# Patient Record
Sex: MALE | Race: WHITE | NOT HISPANIC OR LATINO | Employment: FULL TIME | URBAN - METROPOLITAN AREA
[De-identification: names, ages, dates, MRNs, and addresses within clinical notes are randomized per-mention and may not be internally consistent; named-entity substitution may affect disease eponyms.]

---

## 2019-12-30 ENCOUNTER — EVALUATION (OUTPATIENT)
Dept: PHYSICAL THERAPY | Facility: CLINIC | Age: 54
End: 2019-12-30
Payer: COMMERCIAL

## 2019-12-30 DIAGNOSIS — M77.11 RIGHT LATERAL EPICONDYLITIS: Primary | ICD-10-CM

## 2019-12-30 PROCEDURE — 97162 PT EVAL MOD COMPLEX 30 MIN: CPT | Performed by: PHYSICAL THERAPIST

## 2019-12-30 NOTE — PROGRESS NOTES
PT Evaluation     Today's date: 2019  Patient name: Hosea Luz  : 1965  MRN: [id number]  Referring provider: En Kay PA-C  Dx:   Encounter Diagnosis     ICD-10-CM    1  Right lateral epicondylitis M77 11                   Assessment  Assessment details: Hosea Luz is a 48 y o  male who presents to outpatient PT with a  Right lateral epicondylitis  (primary encounter diagnosis)  No further referral appears necessary at this time based upon examination results  Pt presents with decreased strength, ROM, balance, functional activity tolerance, and pain with movement in his right elbow,  which is  limiting his ability to perform the aforementioned functional activities  Etiologic factors include repetitive poor body mechanics  Prognosis is good given HEP compliance and PT 2/wk  Please contact me if you have any questions or recommendations  Thank you for the opportunity to share in  [facility] care  Impairments: abnormal muscle firing, abnormal or restricted ROM, activity intolerance, impaired physical strength, lacks appropriate home exercise program and pain with function  Functional limitations: Difficulty with recreational activites, Reaching, lifting, driving, and sleeping  Symptom irritability: moderateUnderstanding of Dx/Px/POC: good   Prognosis: good    Goals  1  In 4-6 weeks, patient will demonstrate a decrease in pain to 0/10 during functional activities  2  In 4-6 weeks, patient will demonstrate an increase in range of motion by 5 degrees  3  In 4-6 weeks, patient will demonstrate an increase in strength by 1/2 grade on MMT    1  In 6-8 weeks, patient will be independent with their home exercise program  2  In 6-8 weeks, patient will have zero limitations with strength  3  In 6-8 weeks, patient will have zero limitations with ROM  4  In 6-8 weeks, patient will have zero limitations with Return to mountain biking   5    In 6-8 weeks, patient will have zero limitations with Sleeping, and driving       Plan  Plan details: Patient was provided a home exercise program and demonstrated an understanding of exercises  Patient was advised to stop performing home exercise program if symptoms increase or new complaints developed  Verbal understanding demonstrated regarding home exercise program instructions  Patient would benefit from: skilled physical therapy  Planned therapy interventions: ADL retraining, ADL training, flexibility, functional ROM exercises, graded activity, graded exercise, therapeutic exercise, therapeutic activities, stretching, strengthening, manual therapy, joint mobilization, neuromuscular re-education and patient education  Frequency: 2x week  Duration in weeks: 4  Plan of Care beginning date: 2019  Plan of Care expiration date: 2020  Treatment plan discussed with: patient        Subjective Evaluation    History of Present Illness  Date of onset: 2019  Mechanism of injury: The patient is a 48year old male who presents to outpatient physical therapy with a chief complaint of right elbow pain  Patient reports pain initially started back in May  He is an avid mountain bike rider, and believes the vibration through handle bars, may be contributing to the pain  Reports difficulty with lifting and reaching for objects  Pain  Current pain rating: 3  At best pain rating: 3  At worst pain ratin  Quality: dull ache and discomfort  Relieving factors: change in position, rest, relaxation, support and ice  Aggravating factors: lifting  Progression: no change    Social Support    Employment status: working (Works at a [employer] house )  Hand dominance: right    Treatments  Previous treatment: medication  Current treatment: physical therapy  Patient Goals  Patient goals for therapy: increased strength, decreased pain and increased motion  Patient goal: Return to normal level of physical activity           Objective     Active Range of Motion     Left Elbow Flexion: WFL  Extension: WFL  Forearm supination: WFL  Forearm pronation: WFL    Right Elbow   Flexion: WFL  Extension: WFL  Forearm supination: WFL  Forearm pronation: WFL    Left Wrist   Wrist flexion: WFL  Wrist extension: WFL    Right Wrist   Wrist flexion: WFL  Wrist extension: [facility]    Additional Active Range of Motion Details  Pain and tenderness to right and left Brachioradialis, Lateral epicondyle     Mills Test ( Right ) Positive     Strength/Myotome Testing     Left Elbow   Flexion: 4+  Extension: 4+  Forearm supination: 4+  Forearm pronation: 4+    Right Elbow   Flexion: 4+  Extension: 4+  Forearm supination: 4+ (Pain )  Forearm pronation: 4+ (Pain )             Precautions: None (RA due 1/27)      Manual              IASTM to right lateral  epicondyle                                                                      Exercise Diary              Pronation Supination with Cane             Wrist Curl With DB              Wrist Extension with DB              Wrist Roller              Neutral Bicep Curl              Reverse Bicep Curl              Bicep Curl              Radial/Ulnar Deviation with DB              TB Wrist Bar              Digiflex               Wrist flexion stretch              Wrist Extension Stretch                                                                                                                           Modalities

## 2019-12-30 NOTE — LETTER
2019    [address] [place]  [address] [facility]  [id number]    Patient: Pretty Mendez   YOB: 1965   Date of Visit: 2019     Encounter Diagnosis     ICD-10-CM    1  Right lateral epicondylitis M77 11        Dear Dr Isha Gerardo: Thank you for your recent referral of Pretty Mendez  Please review the attached evaluation summary from [facility] recent visit  Please verify that you agree with the plan of care by signing the attached order  If you have any questions or concerns, please do not hesitate to call  I sincerely appreciate the opportunity to share in the care of one of your patients and hope to have another opportunity to work with you in the near future  Sincerely,    Saba Levine      Referring Provider:      I certify that I have read the below Plan of Care and certify the need for these services furnished under this plan of treatment while under my care  [address]  [address] [facility]  [address] [facility] [address]: [phone]          PT Evaluation     Today's date: 2019  Patient name: Pretty Mendez  : 1965  MRN: [id number]  Referring provider: Jorge Alberto Brar PA-C  Dx:   Encounter Diagnosis     ICD-10-CM    1  Right lateral epicondylitis M77 11                   Assessment  Assessment details: Pretty Mendez is a 48 y o  male who presents to outpatient PT with a  Right lateral epicondylitis  (primary encounter diagnosis)  No further referral appears necessary at this time based upon examination results  Pt presents with decreased strength, ROM, balance, functional activity tolerance, and pain with movement in his right elbow,  which is  limiting his ability to perform the aforementioned functional activities  Etiologic factors include repetitive poor body mechanics  Prognosis is good given HEP compliance and PT 2/wk  Please contact me if you have any questions or recommendations    Thank you for the opportunity to share in  [facility]  Impairments: abnormal muscle firing, abnormal or restricted ROM, activity intolerance, impaired physical strength, lacks appropriate home exercise program and pain with function  Functional limitations: Difficulty with recreational activites, Reaching, lifting, driving, and sleeping  Symptom irritability: moderateUnderstanding of Dx/Px/POC: good   Prognosis: good    Goals  1  In 4-6 weeks, patient will demonstrate a decrease in pain to 0/10 during functional activities  2  In 4-6 weeks, patient will demonstrate an increase in range of motion by 5 degrees  3  In 4-6 weeks, patient will demonstrate an increase in strength by 1/2 grade on MMT    1  In 6-8 weeks, patient will be independent with their home exercise program  2  In 6-8 weeks, patient will have zero limitations with strength  3  In 6-8 weeks, patient will have zero limitations with ROM  4  In 6-8 weeks, patient will have zero limitations with Return to mountain biking   5  In 6-8 weeks, patient will have zero limitations with Sleeping, and driving       Plan  Plan details: Patient was provided a home exercise program and demonstrated an understanding of exercises  Patient was advised to stop performing home exercise program if symptoms increase or new complaints developed  Verbal understanding demonstrated regarding home exercise program instructions      Patient would benefit from: skilled physical therapy  Planned therapy interventions: ADL retraining, ADL training, flexibility, functional ROM exercises, graded activity, graded exercise, therapeutic exercise, therapeutic activities, stretching, strengthening, manual therapy, joint mobilization, neuromuscular re-education and patient education  Frequency: 2x week  Duration in weeks: 4  Plan of Care beginning date: 12/30/2019  Plan of Care expiration date: 1/27/2020  Treatment plan discussed with: patient        Subjective Evaluation    History of Present Illness  Date of onset: 2019  Mechanism of injury: The patient is a 48year old male who presents to outpatient physical therapy with a chief complaint of right elbow pain  Patient reports pain initially started back in May  He is an avid mountain bike rider, and believes the vibration through handle bars, may be contributing to the pain  Reports difficulty with lifting and reaching for objects  Pain  Current pain rating: 3  At best pain rating: 3  At worst pain ratin  Quality: dull ache and discomfort  Relieving factors: change in position, rest, relaxation, support and ice  Aggravating factors: lifting  Progression: no change    Social Support    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    Treatments  Previous treatment: medication  Current treatment: physical therapy  Patient Goals  Patient goals for therapy: increased strength, decreased pain and increased motion  Patient goal: Return to normal level of physical activity  Objective     Active Range of Motion     Left Elbow   Flexion: WFL  Extension: WFL  Forearm supination: WFL  Forearm pronation: WFL    Right Elbow   Flexion: WFL  Extension: WFL  Forearm supination: WFL  Forearm pronation: WFL    Left Wrist   Wrist flexion: WFL  Wrist extension: WFL    Right Wrist   Wrist flexion: WFL  Wrist extension: [facility]    Additional Active Range of Motion Details  Pain and tenderness to right and left Brachioradialis, Lateral epicondyle     Mills Test ( Right ) Positive     Strength/Myotome Testing     Left Elbow   Flexion: 4+  Extension: 4+  Forearm supination: 4+  Forearm pronation: 4+    Right Elbow   Flexion: 4+  Extension: 4+  Forearm supination: 4+ (Pain )  Forearm pronation: 4+ (Pain )             Precautions: None (RA due )      Manual              IASTM to right lateral  epicondyle                                                                      Exercise Diary Pronation Supination with Cane             Wrist Curl With DB              Wrist Extension with DB              Wrist Roller              Neutral Bicep Curl              Reverse Bicep Curl              Bicep Curl              Radial/Ulnar Deviation with DB              TB Wrist Bar              Digiflex               Wrist flexion stretch              Wrist Extension Stretch                                                                                                                           Modalities

## 2020-01-03 ENCOUNTER — OFFICE VISIT (OUTPATIENT)
Dept: PHYSICAL THERAPY | Facility: CLINIC | Age: 55
End: 2020-01-03
Payer: COMMERCIAL

## 2020-01-03 DIAGNOSIS — M77.11 RIGHT LATERAL EPICONDYLITIS: Primary | ICD-10-CM

## 2020-01-03 PROCEDURE — 97140 MANUAL THERAPY 1/> REGIONS: CPT | Performed by: PHYSICAL THERAPIST

## 2020-01-03 PROCEDURE — 97110 THERAPEUTIC EXERCISES: CPT | Performed by: PHYSICAL THERAPIST

## 2020-01-06 ENCOUNTER — OFFICE VISIT (OUTPATIENT)
Dept: PHYSICAL THERAPY | Facility: CLINIC | Age: 55
End: 2020-01-06
Payer: COMMERCIAL

## 2020-01-06 DIAGNOSIS — M77.11 RIGHT LATERAL EPICONDYLITIS: Primary | ICD-10-CM

## 2020-01-06 PROCEDURE — 97140 MANUAL THERAPY 1/> REGIONS: CPT

## 2020-01-06 PROCEDURE — 97110 THERAPEUTIC EXERCISES: CPT

## 2020-01-06 NOTE — PROGRESS NOTES
Daily Note     Today's date: 2020  Patient name: Mario Terry  : 1965  MRN: [id number]  Referring provider: Aracelis Jensen PA-C  Dx:   Encounter Diagnosis     ICD-10-CM    1  Right lateral epicondylitis M77 11                   Subjective: Pt reports some increased soreness R elbow following last PT session    Objective: See treatment diary below      Assessment: Tolerated treatment well  Patient exhibited good technique with therapeutic exercises and would benefit from continued PT to decrease pain and increase strength R UE  Plan: Continue per plan of care        Precautions: None (RA due )      Manual  1/3 1/6      IASTM to right lateral  epicondyle  X 10 min Performed  GT 3, GT5  x10 min  performed                                           Exercise Diary  1/3  1/6      Pronation Supination with Cane 1# 2x10 1#   2x10      Wrist Curl With DB  2# 2x10 2# 2x10      Wrist Extension with DB  2# 2x10 2# 2x10      Wrist Roller  1# 3 passes  1# x3 passes      Neutral Bicep Curl         Reverse Bicep Curl         Bicep Curl         Radial/Ulnar Deviation with DB  2x10 2#  2# 2x10      TB Wrist Bar         Digiflex          Wrist flexion stretch  30''3x 30" x3      Wrist Extension Stretch   30''3x 30" x3      UBE  120 RPM 10 min  120 rpm  x10 min                                                                  Modalities

## 2020-01-08 ENCOUNTER — OFFICE VISIT (OUTPATIENT)
Dept: PHYSICAL THERAPY | Facility: CLINIC | Age: 55
End: 2020-01-08
Payer: COMMERCIAL

## 2020-01-08 DIAGNOSIS — M77.11 RIGHT LATERAL EPICONDYLITIS: Primary | ICD-10-CM

## 2020-01-08 PROCEDURE — 97140 MANUAL THERAPY 1/> REGIONS: CPT

## 2020-01-08 PROCEDURE — 97110 THERAPEUTIC EXERCISES: CPT

## 2020-01-08 NOTE — PROGRESS NOTES
Daily Note     Today's date: 2020  Patient name: Maki Capone  : 1965  MRN: [id number]  Referring provider: Sergio Lilly PA-C  Dx:   Encounter Diagnosis     ICD-10-CM    1  Right lateral epicondylitis M77 11        Start Time: 1403          Subjective: Pt reports noticed slight improvement  Objective: See treatment diary below      Assessment: Tolerated treatment well  Patient exhibited good technique with therapeutic exercises and would benefit from continued PT      Plan: Continue per plan of care        Precautions: None (RA due )      Manual  1/3 1/6 1     IASTM to right lateral  epicondyle  X 10 min Performed  GT 3, GT5  x10 min  performed  x10 min performed                                         Exercise Diary  1/3  1/6 1/8     Pronation Supination with Cane 1# 2x10 1#   2x10      Wrist Curl With DB  2# 2x10 2# 2x10 2" 2x10     Wrist Extension with DB  2# 2x10 2# 2x10 2# 2x10     Wrist Roller  1# 3 passes  1# x3 passes 1# x3 passes     Neutral Bicep Curl         Reverse Bicep Curl         Bicep Curl         Radial/Ulnar Deviation with DB  2x10 2#  2# 2x10 2# x20     TB Wrist Bar         Digiflex          Wrist flexion stretch  30''3x 30" x3 30" x3     Wrist Extension Stretch   30''3x 30" x3 30 x3     UBE  120 RPM 10 min  120 rpm  x10 min  fwd 120 rpm  x10 min  fwd                                                                 Modalities

## 2020-01-14 ENCOUNTER — OFFICE VISIT (OUTPATIENT)
Dept: PHYSICAL THERAPY | Facility: CLINIC | Age: 55
End: 2020-01-14
Payer: COMMERCIAL

## 2020-01-14 DIAGNOSIS — M77.11 RIGHT LATERAL EPICONDYLITIS: Primary | ICD-10-CM

## 2020-01-14 PROCEDURE — 97110 THERAPEUTIC EXERCISES: CPT

## 2020-01-14 PROCEDURE — 97140 MANUAL THERAPY 1/> REGIONS: CPT

## 2020-01-14 NOTE — PROGRESS NOTES
Daily Note     Today's date: 2020  Patient name: Segundo Xiong  : 1965  MRN: [id number]  Referring provider: Luanne Almodovar PA-C  Dx:   Encounter Diagnosis     ICD-10-CM    1  Right lateral epicondylitis M77 11                   Subjective: Pt c/o 3-10 R elbow pain today  States more of a dull pain now  Objective: See treatment diary below      Assessment: Tolerated treatment well  No point tenderness noted R elbow today  Que increased resistance with PREs without c/o  Patient exhibited good technique with therapeutic exercises and would benefit from continued PT      Plan: Continue per plan of care        Precautions: None (RA due )      Manual  1/3 1/6 1/8 1/14    IASTM to right lateral  epicondyle  X 10 min Performed  GT 3, GT5  x10 min  performed  x10 min performed x10 min performed                                        Exercise Diary  1/3  1/6 1/8 1/14    Pronation Supination with Cane 1# 2x10 1#   2x10 1#   2x10 1#   2x10    Wrist Curl With DB  2# 2x10 2# 2x10 2# 2x10 3# 2x10     Wrist Extension with DB  2# 2x10 2# 2x10 2# 2x10 3# 2x10    Wrist Roller  1# 3 passes  1# x3 passes 1# x3 passes 2# x3     Neutral Bicep Curl     2# 10    Reverse Bicep Curl     2# x10    Bicep Curl     2# x10    Radial/Ulnar Deviation with DB  2x10 2#  2# 2x10 2# x20 3# x20    TB Wrist Bar         Digiflex      Green  5" 2x10    Wrist flexion stretch  30''3x 30" x3 30" x3 30" x3    Wrist Extension Stretch   30''3x 30" x3 30 x3 30" x3    UBE  120 RPM 10 min  120 rpm  x10 min  fwd 120 rpm  x10 min  fwd 120rpm  x10 fwd                                                                Modalities

## 2020-01-16 ENCOUNTER — OFFICE VISIT (OUTPATIENT)
Dept: PHYSICAL THERAPY | Facility: CLINIC | Age: 55
End: 2020-01-16
Payer: COMMERCIAL

## 2020-01-16 DIAGNOSIS — M77.11 RIGHT LATERAL EPICONDYLITIS: Primary | ICD-10-CM

## 2020-01-16 PROCEDURE — 97140 MANUAL THERAPY 1/> REGIONS: CPT

## 2020-01-16 PROCEDURE — 97110 THERAPEUTIC EXERCISES: CPT

## 2020-01-16 NOTE — PROGRESS NOTES
Daily Note     Today's date: 2020  Patient name: Surya Macario  : 1965  MRN: [id number]  Referring provider: Ky Brown PA-C  Dx:   Encounter Diagnosis     ICD-10-CM    1  Right lateral epicondylitis M77 11                   Subjective: "Today is the first day that I can say that my elbow is feeling better " Pt reports decreased pain of the R elbow post Tx  Objective: See treatment diary below      Assessment: Tolerated treatment well  Patient demonstrated fatigue post treatment and would benefit from continued PT  No adverse affect to IASTM or CP this date  Plan: Continue per plan of care        Precautions: None (RA due )      Manual  1/3 1/6 1/8 1/14 1/16   IASTM to right lateral  epicondyle  X 10 min Performed  GT 3, GT5  x10 min  performed  x10 min performed x10 min performed Perfomedd                                x10 min       Exercise Diary  1/3  1/6 1/8 1/14 1/16   Pronation Supination with Cane 1# 2x10 1#   2x10 1#   2x10 1#   2x10 1#   2x10   Wrist Curl With DB  2# 2x10 2# 2x10 2# 2x10 3# 2x10  3# 2x10   Wrist Extension with DB  2# 2x10 2# 2x10 2# 2x10 3# 2x10 3# 2x10   Wrist Roller  1# 3 passes  1# x3 passes 1# x3 passes 2# x3  2# x3 passes   Neutral Bicep Curl     2# 10 2# 2x10   Reverse Bicep Curl     2# x10 2# 2x10   Bicep Curl     2# x10 2# 2x10   Radial/Ulnar Deviation with DB  2x10 2#  2# 2x10 2# x20 3# x20 3# x20   TB Wrist Bar         Digiflex      Green  5" 2x10 Green  5" 2x10   Wrist flexion stretch  30''3x 30" x3 30" x3 30" x3 30" x3   Wrist Extension Stretch   30''3x 30" x3 30" x3 30" x3 30" x3   UBE  120 RPM 10 min  120 rpm  x10 min  fwd 120 rpm  x10 min  fwd 120rpm  x10 fwd 120 rpm  x10 min  fwd                                                               Modalities              CP post Tx x10 min

## 2020-01-21 ENCOUNTER — OFFICE VISIT (OUTPATIENT)
Dept: PHYSICAL THERAPY | Facility: CLINIC | Age: 55
End: 2020-01-21
Payer: COMMERCIAL

## 2020-01-21 DIAGNOSIS — M77.11 RIGHT LATERAL EPICONDYLITIS: Primary | ICD-10-CM

## 2020-01-21 PROCEDURE — 97110 THERAPEUTIC EXERCISES: CPT | Performed by: PHYSICAL THERAPIST

## 2020-01-21 PROCEDURE — 97140 MANUAL THERAPY 1/> REGIONS: CPT | Performed by: PHYSICAL THERAPIST

## 2020-01-21 NOTE — PROGRESS NOTES
Daily Note     Today's date: 2020  Patient name: Jag Higginbotham  : 1965  MRN: [id number]  Referring provider: Julia Mary PA-C  Dx:   Encounter Diagnosis     ICD-10-CM    1  Right lateral epicondylitis M77 11                   Subjective: " I am feeling pretty good, I think we have finally turned the corner "       Objective: See treatment diary below      Assessment: Tolerated treatment well  Patient exhibited good technique with therapeutic exercises and would benefit from continued PT  Improvement in soft tissue mobility, during IASTM to right elbow  Pt reports decreased sx in right elbow since SOC  Plan: Continue per plan of care        Precautions: None (RA due )      Manual     IASTM to right lateral  epicondyle  X 10  min  Performed  GT 3, GT5  x10 min  performed  x10 min performed x10 min performed Perfomedd                                x10 min       Exercise Diary     Pronation Supination with Cane 1# 2x10 1#   2x10 1#   2x10 1#   2x10 1#   2x10   Wrist Curl With DB  2# 2x10 2# 2x10 2# 2x10 3# 2x10  3# 2x10   Wrist Extension with DB  2# 2x10 2# 2x10 2# 2x10 3# 2x10 3# 2x10   Wrist Roller  2# 3 passes  1# x3 passes 1# x3 passes 2# x3  2# x3 passes   Neutral Bicep Curl  5# 2x10   2# 10 2# 2x10   Reverse Bicep Curl  5# 2x10   2# x10 2# 2x10   Bicep Curl  5# 2x10    2# x10 2# 2x10   Radial/Ulnar Deviation with DB  2x10 2#  2# 2x10 2# x20 3# x20 3# x20   TB Wrist Bar         Digiflex      Green  5" 2x10 Green  5" 2x10   Wrist flexion stretch  30''3x 30" x3 30" x3 30" x3 30" x3   Wrist Extension Stretch   30''3x 30" x3 30" x3 30" x3 30" x3   UBE  100 RPM x10 min  120 rpm  x10 min  fwd 120 rpm  x10 min  fwd 120rpm  x10 fwd 120 rpm  x10 min  fwd                                                               Modalities          CP post Tx x10 min

## 2020-01-23 ENCOUNTER — OFFICE VISIT (OUTPATIENT)
Dept: PHYSICAL THERAPY | Facility: CLINIC | Age: 55
End: 2020-01-23
Payer: COMMERCIAL

## 2020-01-23 DIAGNOSIS — M77.11 RIGHT LATERAL EPICONDYLITIS: Primary | ICD-10-CM

## 2020-01-23 PROCEDURE — 97110 THERAPEUTIC EXERCISES: CPT | Performed by: PHYSICAL THERAPIST

## 2020-01-23 PROCEDURE — 97140 MANUAL THERAPY 1/> REGIONS: CPT | Performed by: PHYSICAL THERAPIST

## 2020-01-23 NOTE — PROGRESS NOTES
Daily Note     Today's date: 2020  Patient name: Nina Chaves  : 1965  MRN: [id number]  Referring provider: Elba Argueta PA-C  Dx:   Encounter Diagnosis     ICD-10-CM    1  Right lateral epicondylitis M77 11                   Subjective: " I actually feel pretty good, It is a little sore "       Objective: See treatment diary below      Assessment: Tolerated treatment well  Patient exhibited good technique with therapeutic exercises and would benefit from continued PT      Plan: Continue per plan of care        Precautions: None (RA due )      Manual     IASTM to right lateral  epicondyle  X 10  min  Performed  GT 3, GT5  x10 min  performed   GT 3, 5 x10 min performed x10 min performed Perfomedd                                x10 min       Exercise Diary     Pronation Supination with Cane 1# 2x10 1#   2x10 1#   2x10 1#   2x10 1#   2x10   Wrist Curl With DB  2# 2x10 4# 3x10 2# 2x10 3# 2x10  3# 2x10   Wrist Extension with DB  2# 2x10 4# 3x10 2# 2x10 3# 2x10 3# 2x10   Wrist Roller  2# 3 passes  3# x3 passes 1# x3 passes 2# x3  2# x3 passes   Neutral Bicep Curl  5# 2x10 5# 3x10   2# 10 2# 2x10   Reverse Bicep Curl  5# 2x10 5# 3x10   2# x10 2# 2x10   Bicep Curl  5# 2x10  5# 3x10   2# x10 2# 2x10   Radial/Ulnar Deviation with DB  2x10 2#  4# 2x10 2# x20 3# x20 3# x20   TB Wrist Bar         Digiflex      Green  5" 2x10 Green  5" 2x10   Wrist flexion stretch  30''3x 30" x3 30" x3 30" x3 30" x3   Wrist Extension Stretch   30''3x 30" x3 30" x3 30" x3 30" x3   UBE  100 RPM x10 min  90 rpm  x10 min  fwd 120 rpm  x10 min  fwd 120rpm  x10 fwd 120 rpm  x10 min  fwd                                                               Modalities          CP post Tx x10 min

## 2020-01-28 ENCOUNTER — TRANSCRIBE ORDERS (OUTPATIENT)
Dept: PHYSICAL THERAPY | Facility: CLINIC | Age: 55
End: 2020-01-28

## 2020-01-28 ENCOUNTER — EVALUATION (OUTPATIENT)
Dept: PHYSICAL THERAPY | Facility: CLINIC | Age: 55
End: 2020-01-28
Payer: COMMERCIAL

## 2020-01-28 DIAGNOSIS — M77.11 RIGHT LATERAL EPICONDYLITIS: Primary | ICD-10-CM

## 2020-01-28 PROCEDURE — 97110 THERAPEUTIC EXERCISES: CPT | Performed by: PHYSICAL THERAPIST

## 2020-01-28 PROCEDURE — 97140 MANUAL THERAPY 1/> REGIONS: CPT | Performed by: PHYSICAL THERAPIST

## 2020-01-28 NOTE — LETTER
2020    [address] [facility]    Patient: Albertina Zhong   YOB: 1965   Date of Visit: 2020     Encounter Diagnosis     ICD-10-CM    1  Right lateral epicondylitis M77 11        Dear Dr Jaspal Pritchard: Thank you for your recent referral of Albertina Zhogn  Please review the attached evaluation summary from [facility] recent visit  Please verify that you agree with the plan of care by signing the attached order  If you have any questions or concerns, please do not hesitate to call  I sincerely appreciate the opportunity to share in the care of one of your patients and hope to have another opportunity to work with you in the near future  Sincerely,    Ashlee Nails, PT      Referring Provider:      I certify that I have read the below Plan of Care and certify the need for these services furnished under this plan of treatment while under my care  FATMATA [address]  [facility] [address]: [phone]                                                        PT Re-Evaluation   Today's date: 2020  Patient name: Albertina Zhong  : 1965  MRN: [id number]  Referring provider: Ibeth Parker PA-C  Dx:   Encounter Diagnosis     ICD-10-CM    1  Right lateral epicondylitis M77 11                   Assessment  Assessment details: Albertina Zhong is a 48 y o  male who presents to outpatient PT with a  Right lateral epicondylitis  (primary encounter diagnosis)  No further referral appears necessary at this time based upon examination results  Pt presents with decreased strength, ROM, balance, functional activity tolerance, and pain with movement in his right elbow,  which is  limiting his ability to perform the aforementioned functional activities  Etiologic factors include repetitive poor body mechanics  Prognosis is good given HEP compliance and PT 2/wk    Please contact me if you have any questions or recommendations  Thank you for the opportunity to share in  [facility]  RA 1/28     Maki Capone has demonstrated decreased pain, increased strength, increased range of motion, and increased activity tolerance since starting physical therapy services  They report an overall improvement of 80% thus far  He continues  to present with pain, decreased strength, decreased range of motion, and decreased activity tolerance and would benefit from additional skilled physical therapy interventions to address impairments and maximize function  Impairments: abnormal muscle firing, abnormal or restricted ROM, activity intolerance, impaired physical strength, lacks appropriate home exercise program and pain with function  Functional limitations: Difficulty with recreational activites, Reaching, lifting, driving, and sleeping  Symptom irritability: moderateUnderstanding of Dx/Px/POC: good   Prognosis: good    Goals  1  In 4-6 weeks, patient will demonstrate a decrease in pain to 0/10 during functional activities  Met   2  In 4-6 weeks, patient will demonstrate an increase in range of motion by 5 degrees  Met   3  In 4-6 weeks, patient will demonstrate an increase in strength by 1/2 grade on MMT  Met     1  In 6-8 weeks, patient will be independent with their home exercise program  Met   2  In 6-8 weeks, patient will have zero limitations with strength  Progressing   3  In 6-8 weeks, patient will have zero limitations with ROM  Progressing   4  In 6-8 weeks, patient will have zero limitations with Return to mountain biking   Progressing   5  In 6-8 weeks, patient will have zero limitations with Sleeping, and driving   Azar [address] details: Patient was provided a home exercise program and demonstrated an understanding of exercises  Patient was advised to stop performing home exercise program if symptoms increase or new complaints developed    Verbal understanding demonstrated regarding home exercise program instructions  Patient would benefit from: skilled physical therapy  Planned therapy interventions: ADL retraining, ADL training, flexibility, functional ROM exercises, graded activity, graded exercise, therapeutic exercise, therapeutic activities, stretching, strengthening, manual therapy, joint mobilization, neuromuscular re-education and patient education  Frequency: 2x week  Duration in weeks: 4  Plan of Care beginning date: 2020  Plan of Care expiration date: 2020  Treatment plan discussed with: patient        Subjective Evaluation    History of Present Illness  Date of onset: 2019  Mechanism of injury: The patient is a 48year old male who presents to outpatient physical therapy with a chief complaint of right elbow pain  Patient reports pain initially started back in May  He is an avid mountain bike rider, and believes the vibration through handle bars, may be contributing to the pain  Reports difficulty with lifting and reaching for objects  RA      The patient reports an improvement of 80 percent since beginning skilled PT  Reports occasional " twinges" of pain in his lateral right elbow  However, the pain is not constant  He reports relief from IASTM to right lateral elbow  He has not tried mountain biking up to this point     Pain                                          Current pain rating: 3                0   At best pain rating: 3                 0   At worst pain ratin               3   Quality: dull ache and discomfort  Relieving factors: change in position, rest, relaxation, support and ice  Aggravating factors: lifting  Progression: no change    Social Support    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    Treatments  Previous treatment: medication  Current treatment: physical therapy  Patient Goals  Patient goals for therapy: increased strength, decreased pain and increased motion  Patient goal: Return to normal level of physical activity  Objective     Active Range of Motion     Left Elbow                                           1/28 Continues   Flexion: WFL  Extension: [facility]  Forearm supination: [facility]  Forearm pronation: WFL    Right Elbow   Flexion: WFL  Extension: WFL  Forearm supination: WFL  Forearm pronation: WFL    Left Wrist   Wrist flexion: WFL  Wrist extension: WFL    Right Wrist   Wrist flexion: WFL  Wrist extension: [facility]    Additional Active Range of Motion Details  Pain and tenderness to right and left Brachioradialis, Lateral epicondyle     Mills Test ( Right ) Positive     Strength/Myotome Testing     Left Elbow   Flexion: 4+  Extension: 4+  Forearm supination: 4+  Forearm pronation: 4+    Right Elbow                                                          1/28   Flexion: 4+                                                            4+/5                       Extension: 4+                                                       4+/5   Forearm supination: 4+ (Pain )                            4+/5 1/10 pain   Forearm pronation: 4+ (Pain )                             4+/5 1/10 pain              Precautions: None (RA due 1/27)      Manual  1/21 1/23 1/28 1/14 1/16   IASTM to right lateral  epicondyle  X 10  min  Performed  GT 3, GT5  x10 min  performed   GT 3, 5 x10 min performed  GT 3, 5 x10 min performed Perfomedd                              X 10 min   x10 min       Exercise Diary  1/21 1/23 1/28 1/14 1/16   Pronation Supination with Cane 1# 2x10 1#   2x10 1#   3x10 1#   2x10 1#   2x10   Wrist Curl With DB  2# 2x10 4# 3x10 4# 3x10 3# 2x10  3# 2x10   Wrist Extension with DB  2# 2x10 4# 3x10 4# 2x10 3# 2x10 3# 2x10   Wrist Roller  2# 3 passes  3# x3 passes 3# x3 passes 2# x3  2# x3 passes   Neutral Bicep Curl  5# 2x10 5# 3x10  5# 3x10 2# 10 2# 2x10   Reverse Bicep Curl  5# 2x10 5# 3x10  5# 3x10 2# x10 2# 2x10   Bicep Curl  5# 2x10  5# 3x10  5# 3x10 2# x10 2# 2x10   Radial/Ulnar Deviation with DB  2x10 2#  4# 2x10 4# x20 3# x20 3# x20   TB Wrist Bar         Digiflex      Green  5" 2x10 Green  5" 2x10   Wrist flexion stretch  30''3x 30" x3 30" x3 30" x3 30" x3   Wrist Extension Stretch   30''3x 30" x3 30" x3 30" x3 30" x3   UBE  100 RPM x10 min  90 rpm  x10 min  fwd 90 rpm  x10 min  fwd 120rpm  x10 fwd 120 rpm  x10 min  fwd                                                               Modalities  1/21 1/23   1/16        CP post Tx x10 min

## 2020-01-28 NOTE — PROGRESS NOTES
PT Re-Evaluation   Today's date: 2020  Patient name: Claudia Vega  : 1965  MRN: [id number]  Referring provider: Zacarias North PA-C  Dx:   Encounter Diagnosis     ICD-10-CM    1  Right lateral epicondylitis M77 11                   Assessment  Assessment details: Claudia Vega is a 48 y o  male who presents to outpatient PT with a  Right lateral epicondylitis  (primary encounter diagnosis)  No further referral appears necessary at this time based upon examination results  Pt presents with decreased strength, ROM, balance, functional activity tolerance, and pain with movement in his right elbow,  which is  limiting his ability to perform the aforementioned functional activities  Etiologic factors include repetitive poor body mechanics  Prognosis is good given HEP compliance and PT 2/wk  Please contact me if you have any questions or recommendations  Thank you for the opportunity to share in  [facility]  RA      Claudia Vega has demonstrated decreased pain, increased strength, increased range of motion, and increased activity tolerance since starting physical therapy services  They report an overall improvement of 80% thus far  He continues  to present with pain, decreased strength, decreased range of motion, and decreased activity tolerance and would benefit from additional skilled physical therapy interventions to address impairments and maximize function  Impairments: abnormal muscle firing, abnormal or restricted ROM, activity intolerance, impaired physical strength, lacks appropriate home exercise program and pain with function  Functional limitations: Difficulty with recreational activites, Reaching, lifting, driving, and sleeping  Symptom irritability: moderateUnderstanding of Dx/Px/POC: good   Prognosis: good    Goals  1  In 4-6 weeks, patient will demonstrate a decrease in pain to 0/10 during functional activities  Met   2    In 4-6 weeks, patient will demonstrate an increase in range of motion by 5 degrees  Met   3  In 4-6 weeks, patient will demonstrate an increase in strength by 1/2 grade on MMT  Met     1  In 6-8 weeks, patient will be independent with their home exercise program  Met   2  In 6-8 weeks, patient will have zero limitations with strength  Progressing   3  In 6-8 weeks, patient will have zero limitations with ROM  Progressing   4  In 6-8 weeks, patient will have zero limitations with Return to mountain biking   Progressing   5  In 6-8 weeks, patient will have zero limitations with Sleeping, and driving   [address] details: Patient was provided a home exercise program and demonstrated an understanding of exercises  Patient was advised to stop performing home exercise program if symptoms increase or new complaints developed  Verbal understanding demonstrated regarding home exercise program instructions  Patient would benefit from: skilled physical therapy  Planned therapy interventions: ADL retraining, ADL training, flexibility, functional ROM exercises, graded activity, graded exercise, therapeutic exercise, therapeutic activities, stretching, strengthening, manual therapy, joint mobilization, neuromuscular re-education and patient education  Frequency: 2x week  Duration in weeks: 4  Plan of Care beginning date: 1/28/2020  Plan of Care expiration date: 2/25/2020  Treatment plan discussed with: patient        Subjective Evaluation    History of Present Illness  Date of onset: 5/30/2019  Mechanism of injury: The patient is a 48year old male who presents to outpatient physical therapy with a chief complaint of right elbow pain  Patient reports pain initially started back in May  He is an avid mountain bike rider, and believes the vibration through handle bars, may be contributing to the pain  Reports difficulty with lifting and reaching for objects       RA 1/28     The patient reports an improvement of 80 percent since beginning skilled PT  Reports occasional " twinges" of pain in his lateral right elbow  However, the pain is not constant  He reports relief from IASTM to right lateral elbow  He has not tried mountain biking up to this point  Pain                                          Current pain rating: 3                0   At best pain rating: 3                 0   At worst pain ratin               3   Quality: dull ache and discomfort  Relieving factors: change in position, rest, relaxation, support and ice  Aggravating factors: lifting  Progression: no change    Social Support    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    Treatments  Previous treatment: medication  Current treatment: physical therapy  Patient Goals  Patient goals for therapy: increased strength, decreased pain and increased motion  Patient goal: Return to normal level of physical activity  Objective     Active Range of Motion     Left Elbow                                            Continues   Flexion: WF  Extension: [facility]  Forearm supination: [facility]  Forearm pronation: WFL    Right Elbow   Flexion: WFL  Extension: WFL  Forearm supination: WFL  Forearm pronation: WFL    Left Wrist   Wrist flexion: [facility]  Wrist extension: WFL    Right Wrist   Wrist flexion: WFL  Wrist extension: [facility]    Additional Active Range of Motion Details  Pain and tenderness to right and left Brachioradialis, Lateral epicondyle     Mills Test ( Right ) Positive     Strength/Myotome Testing     Left Elbow   Flexion: 4+  Extension: 4+  Forearm supination: 4+  Forearm pronation: 4+    Right Elbow                                                             Flexion: 4+                                                            4+/5                       Extension: 4+                                                       4+/5   Forearm supination: 4+ (Pain )                            4+/5 1/10 pain   Forearm pronation: 4+ (Pain )                             4+/5 1/10 pain              Precautions: None (RA due 1/27)      Manual  1/21 1/23 1/28 1/14 1/16   IASTM to right lateral  epicondyle  X 10  min  Performed  GT 3, GT5  x10 min  performed   GT 3, 5 x10 min performed  GT 3, 5 x10 min performed Perfomedd                              X 10 min   x10 min       Exercise Diary  1/21 1/23 1/28 1/14 1/16   Pronation Supination with Cane 1# 2x10 1#   2x10 1#   3x10 1#   2x10 1#   2x10   Wrist Curl With DB  2# 2x10 4# 3x10 4# 3x10 3# 2x10  3# 2x10   Wrist Extension with DB  2# 2x10 4# 3x10 4# 2x10 3# 2x10 3# 2x10   Wrist Roller  2# 3 passes  3# x3 passes 3# x3 passes 2# x3  2# x3 passes   Neutral Bicep Curl  5# 2x10 5# 3x10  5# 3x10 2# 10 2# 2x10   Reverse Bicep Curl  5# 2x10 5# 3x10  5# 3x10 2# x10 2# 2x10   Bicep Curl  5# 2x10  5# 3x10  5# 3x10 2# x10 2# 2x10   Radial/Ulnar Deviation with DB  2x10 2#  4# 2x10 4# x20 3# x20 3# x20   TB Wrist Bar         Digiflex      Green  5" 2x10 Green  5" 2x10   Wrist flexion stretch  30''3x 30" x3 30" x3 30" x3 30" x3   Wrist Extension Stretch   30''3x 30" x3 30" x3 30" x3 30" x3   UBE  100 RPM x10 min  90 rpm  x10 min  fwd 90 rpm  x10 min  fwd 120rpm  x10 fwd 120 rpm  x10 min  fwd                                                               Modalities  1/21 1/23 1/16        CP post Tx x10 min

## 2020-01-30 ENCOUNTER — OFFICE VISIT (OUTPATIENT)
Dept: PHYSICAL THERAPY | Facility: CLINIC | Age: 55
End: 2020-01-30
Payer: COMMERCIAL

## 2020-01-30 DIAGNOSIS — M77.11 RIGHT LATERAL EPICONDYLITIS: Primary | ICD-10-CM

## 2020-01-30 PROCEDURE — 97110 THERAPEUTIC EXERCISES: CPT | Performed by: PHYSICAL THERAPIST

## 2020-01-30 PROCEDURE — 97140 MANUAL THERAPY 1/> REGIONS: CPT | Performed by: PHYSICAL THERAPIST

## 2020-01-30 NOTE — PROGRESS NOTES
Daily Note     Today's date: 2020  Patient name: Lianet Fletcher  : 1965  MRN: [id number]  Referring provider: Benja Juan PA-C  Dx:   Encounter Diagnosis     ICD-10-CM    1  Right lateral epicondylitis M77 11                    Subjective:  " I feel pretty good, My elbow is feeling better  "      Objective: See treatment diary below      Assessment: Tolerated treatment well  Patient exhibited good technique with therapeutic exercises and would benefit from continued PT  Progressed TE to 5# dumbbells this session, good tolerance to progressions  Plan: Continue per plan of care        Precautions: None (RA due )      Manual     IASTM to right lateral  epicondyle  X 10  min  Performed  GT 3, GT5  x10 min  performed   GT 3, 5 x10 min performed  GT 3, 5 x10 min performed GT 3, 5  Perfomedd                              X 10 min  X 10 min  x10 min       Exercise Diary     Pronation Supination with Cane 1# 2x10 1#   2x10 1#   3x10 1#   3x10 1#   2x10   Wrist Curl With DB  2# 2x10 4# 3x10 4# 3x10 5#   3x10 3# 2x10   Wrist Extension with DB  2# 2x10 4# 3x10 4# 2x10 5# 3x10  3# 2x10   Wrist Roller  2# 3 passes  3# x3 passes 3# x3 passes 3# x3 passes  2# x3 passes   Neutral Bicep Curl  5# 2x10 5# 3x10  5# 3x10 5# 3x10 2# 2x10   Reverse Bicep Curl  5# 2x10 5# 3x10  5# 3x10 5# 3x10  2# 2x10   Bicep Curl  5# 2x10  5# 3x10  5# 3x10 5# 3x10 2# 2x10   Radial/Ulnar Deviation with DB  2x10 2#  4# 2x10 4# x20 5# 3x10  3# x20   TB Wrist Bar         Digiflex       Green  5" 2x10   Wrist flexion stretch  30''3x 30" x3 30" x3 30''x3 30" x3   Wrist Extension Stretch   30''3x 30" x3 30" x3 30''x3 30" x3   UBE  100 RPM x10 min  90 rpm  x10 min  fwd 90 rpm  x10 min  fwd 90 rpm  x10 min  fwd 120 rpm  x10 min  fwd                                                               Modalities          CP post Tx x10 min

## 2020-02-04 ENCOUNTER — OFFICE VISIT (OUTPATIENT)
Dept: PHYSICAL THERAPY | Facility: CLINIC | Age: 55
End: 2020-02-04
Payer: COMMERCIAL

## 2020-02-04 DIAGNOSIS — M77.11 RIGHT LATERAL EPICONDYLITIS: Primary | ICD-10-CM

## 2020-02-04 PROCEDURE — 97110 THERAPEUTIC EXERCISES: CPT | Performed by: PHYSICAL THERAPIST

## 2020-02-04 PROCEDURE — 97140 MANUAL THERAPY 1/> REGIONS: CPT | Performed by: PHYSICAL THERAPIST

## 2020-02-04 NOTE — PROGRESS NOTES
Daily Note     Today's date: 2020  Patient name: Lidia Pillai  : 1965  MRN: [id number]  Referring provider: Mo Morrison PA-C  Dx:   Encounter Diagnosis     ICD-10-CM    1  Right lateral epicondylitis M77 11                   Subjective: " Im feeling pretty good, I get occasional tingling in my arm "     Objective: See treatment diary below      Assessment: Tolerated treatment well  Patient exhibited good technique with therapeutic exercises and would benefit from continued PT      Plan: Continue per plan of care        Precautions: None (RA due )      Manual   2/4   IASTM to right lateral  epicondyle  X 10  min  Performed  GT 3, GT5  x10 min  performed   GT 3, 5 x10 min performed  GT 3, 5 x10 min performed GT 3, 5  x10 min performed GT 3, 5                               X 10 min  X 10 min  x10 min       Exercise Diary   2/4   Pronation Supination with Cane 1# 2x10 1#   2x10 1#   3x10 1#   3x10 1#   2x10   Wrist Curl With DB  2# 2x10 4# 3x10 4# 3x10 5#   3x10 5# 3x10   Wrist Extension with DB  2# 2x10 4# 3x10 4# 2x10 5# 3x10  5# 3x10   Wrist Roller  2# 3 passes  3# x3 passes 3# x3 passes 3# x3 passes  3# 5 passes   Neutral Bicep Curl  5# 2x10 5# 3x10  5# 3x10 5# 3x10 5# 3x10   Reverse Bicep Curl  5# 2x10 5# 3x10  5# 3x10 5# 3x10  5# 3x10   Bicep Curl  5# 2x10  5# 3x10  5# 3x10 5# 3x10 5# 3x10   Radial/Ulnar Deviation with DB  2x10 2#  4# 2x10 4# x20 5# 3x10  5# 3x10   TB Wrist Bar         Digiflex          Wrist flexion stretch  30''3x 30" x3 30" x3 30''x3 30" x3   Wrist Extension Stretch   30''3x 30" x3 30" x3 30''x3 30" x3   UBE  100 RPM x10 min  90 rpm  x10 min  fwd 90 rpm  x10 min  fwd 90 rpm  x10 min  fwd 90 rpm  x10 min  fwd                                                               Modalities          CP post Tx x10 min

## 2020-02-06 ENCOUNTER — OFFICE VISIT (OUTPATIENT)
Dept: PHYSICAL THERAPY | Facility: CLINIC | Age: 55
End: 2020-02-06
Payer: COMMERCIAL

## 2020-02-06 DIAGNOSIS — M77.11 RIGHT LATERAL EPICONDYLITIS: Primary | ICD-10-CM

## 2020-02-06 PROCEDURE — 97110 THERAPEUTIC EXERCISES: CPT

## 2020-02-06 PROCEDURE — 97140 MANUAL THERAPY 1/> REGIONS: CPT

## 2020-02-06 NOTE — PROGRESS NOTES
Daily Note     Today's date: 2020  Patient name: Kecia Quinteros  : 1965  MRN: [id number]  Referring provider: Lexis Chavez PA-C  Dx:   Encounter Diagnosis     ICD-10-CM    1  Right lateral epicondylitis M77 11        Start Time: 1315          Subjective: Pt reports R elbow is much improved since beginning therapy  Some activities such as shoveling still cause increased symptoms  Objective: See treatment diary below      Assessment: Tolerated treatment well  Increased resistance with UBC today without c/o increased symptoms  Pt c/o minimal discomfort with end range bicep curls today  Patient demonstrated fatigue post treatment, exhibited good technique with therapeutic exercises and would benefit from continued PT      Plan: Continue per plan of care        Precautions: None (RA due )      Manual   2/4   IASTM to right lateral  epicondyle  X 10  min  Performed   x10 min  performed   GT 3, 5 x10 min performed  GT 3, 5 x10 min performed GT 3, 5  x10 min performed GT 3, 5                             x10 min  X 10 min  X 10 min  x10 min       Exercise Diary   2/4   Pronation Supination with Cane 1#   3x10 1#   2x10 1#   3x10 1#   3x10 1#   2x10   Wrist Curl With DB  5# 3x10 4# 3x10 4# 3x10 5#   3x10 5# 3x10   Wrist Extension with DB  5# 3x10 4# 3x10 4# 2x10 5# 3x10  5# 3x10   Wrist Roller  3# x5 passes 3# x3 passes 3# x3 passes 3# x3 passes  3# 5 passes   Neutral Bicep Curl  5# 3x10 5# 3x10  5# 3x10 5# 3x10 5# 3x10   Reverse Bicep Curl  5# 3x10 5# 3x10  5# 3x10 5# 3x10  5# 3x10   Bicep Curl  5# 3x10 5# 3x10  5# 3x10 5# 3x10 5# 3x10   Radial/Ulnar Deviation with DB  5# 3x10 4# 2x10 4# x20 5# 3x10  5# 3x10   TB Wrist Bar         Digiflex          Wrist flexion stretch  30" x3 30" x3 30" x3 30''x3 30" x3   Wrist Extension Stretch   30" x3 30" x3 30" x3 30''x3 30" x3   UBE  80 rpm  x5'min  Fwd/5'retro 90 rpm  x10 min  fwd 90 rpm  x10 min  fwd 90 rpm  x10 min  fwd 90 rpm  x10 min  fwd                                                               Modalities  2/6 1/23 1/16   CP post tx x10 min    CP post Tx x10 min

## 2020-02-11 ENCOUNTER — OFFICE VISIT (OUTPATIENT)
Dept: PHYSICAL THERAPY | Facility: CLINIC | Age: 55
End: 2020-02-11
Payer: COMMERCIAL

## 2020-02-11 DIAGNOSIS — M77.11 RIGHT LATERAL EPICONDYLITIS: Primary | ICD-10-CM

## 2020-02-11 PROCEDURE — 97140 MANUAL THERAPY 1/> REGIONS: CPT | Performed by: PHYSICAL THERAPIST

## 2020-02-11 PROCEDURE — 97110 THERAPEUTIC EXERCISES: CPT | Performed by: PHYSICAL THERAPIST

## 2020-02-11 NOTE — PROGRESS NOTES
Daily Note     Today's date: 2020  Patient name: Hosea Luz  : 1965  MRN: [id number]  Referring provider: En Kay PA-C  Dx:   Encounter Diagnosis     ICD-10-CM    1  Right lateral epicondylitis M77 11                   Subjective: " Im actually feeling really good  I think this was one of my best weekends "       Objective: See treatment diary below      Assessment: Tolerated treatment well  Patient exhibited good technique with therapeutic exercises and would benefit from continued PT      Plan: Continue per plan of care        Precautions: None (RA due )      Manual   2/4   IASTM to right lateral  epicondyle  X 10  min  Performed   x10 min  performed    x10 min performed  GT 3, 5 x10 min performed GT 3, 5  x10 min performed GT 3, 5                             x10 min  X 10 min  X 10 min  x10 min       Exercise Diary   2/4   Pronation Supination with Cane 1#   3x10 1#   2x10 1#   3x10 1#   3x10 1#   2x10   Wrist Curl With DB  5# 3x10 5# 3x10 4# 3x10 5#   3x10 5# 3x10   Wrist Extension with DB  5# 3x10 5# 3x10 4# 2x10 5# 3x10  5# 3x10   Wrist Roller  3# x5 passes 3# x3 passes 3# x3 passes 3# x3 passes  3# 5 passes   Neutral Bicep Curl  5# 3x10 5# 3x10  5# 3x10 5# 3x10 5# 3x10   Reverse Bicep Curl  5# 3x10 5# 3x10  5# 3x10 5# 3x10  5# 3x10   Bicep Curl  5# 3x10 5# 3x10  5# 3x10 5# 3x10 5# 3x10   Radial/Ulnar Deviation with DB  5# 3x10 5# 2x10 4# x20 5# 3x10  5# 3x10   TB Wrist Bar         Digiflex          Wrist flexion stretch  30" x3 30" x3 30" x3 30''x3 30" x3   Wrist Extension Stretch   30" x3 30" x3 30" x3 30''x3 30" x3   UBE  80 rpm  x5'min  Fwd/5'retro 90 rpm  x10 min  fwd 90 rpm  x10 min  fwd 90 rpm  x10 min  fwd 90 rpm  x10 min  fwd                                                               Modalities  16   CP post tx x10 min    CP post Tx x10 min

## 2020-02-13 ENCOUNTER — OFFICE VISIT (OUTPATIENT)
Dept: PHYSICAL THERAPY | Facility: CLINIC | Age: 55
End: 2020-02-13
Payer: COMMERCIAL

## 2020-02-13 DIAGNOSIS — M77.11 RIGHT LATERAL EPICONDYLITIS: Primary | ICD-10-CM

## 2020-02-13 PROCEDURE — 97110 THERAPEUTIC EXERCISES: CPT | Performed by: PHYSICAL THERAPIST

## 2020-02-13 PROCEDURE — 97140 MANUAL THERAPY 1/> REGIONS: CPT | Performed by: PHYSICAL THERAPIST

## 2020-02-18 ENCOUNTER — OFFICE VISIT (OUTPATIENT)
Dept: PHYSICAL THERAPY | Facility: CLINIC | Age: 55
End: 2020-02-18
Payer: COMMERCIAL

## 2020-02-18 DIAGNOSIS — M77.11 RIGHT LATERAL EPICONDYLITIS: Primary | ICD-10-CM

## 2020-02-18 PROCEDURE — 97110 THERAPEUTIC EXERCISES: CPT | Performed by: PHYSICAL THERAPIST

## 2020-02-18 PROCEDURE — 97140 MANUAL THERAPY 1/> REGIONS: CPT | Performed by: PHYSICAL THERAPIST

## 2020-02-18 NOTE — PROGRESS NOTES
Daily Note     Today's date: 2020  Patient name: aSmmy Calderón  : 1965  MRN: [id number]  Referring provider: Osbaldo Santacruz PA-C  Dx:   Encounter Diagnosis     ICD-10-CM    1  Right lateral epicondylitis M77 11                   Subjective: " I am ok, My elbow feels great "       Objective: See treatment diary below      Assessment: Tolerated treatment well  Patient exhibited good technique with therapeutic exercises and would benefit from continued PT   Added Multiple gym exercises this session, to help increase right elbow flexor strength  Good tolerance to gym exercises this session  Plan: Continue per plan of care        Precautions: None (RA due )      Manual    2/4   IASTM to right lateral  epicondyle  X 10  min  Performed   x10 min  performed    x10 min performed   x10 min performed GT 3, 5 x10 min performed GT 3, 5                             x10 min  X 10 min  X 10 min  x10 min       Exercise Diary    2/4   Pronation Supination with Cane 1#   3x10 1#   2x10 1#   3x10  1#   2x10   Wrist Curl With DB  5# 3x10 5# 3x10 5# 3x10 5#   3x10 5# 3x10   Wrist Extension with DB  5# 3x10 5# 3x10 5# 2x10 5# 3x10  5# 3x10   Wrist Roller  3# x5 passes 3# x3 passes 3# x3 passes 3# x3 passes  3# 5 passes   Neutral Bicep Curl  5# 3x10 5# 3x10  5# 3x10 5# 3x10 5# 3x10   Reverse Bicep Curl  5# 3x10 5# 3x10  5# 3x10 5# 3x10  5# 3x10   Bicep Curl  5# 3x10 5# 3x10  5# 3x10 5# 3x10 5# 3x10   Radial/Ulnar Deviation with DB  5# 3x10 5# 2x10 5# x20 5# 3x10  5# 3x10   TB Wrist Bar         Digiflex          Wrist flexion stretch  30" x3 30" x3 30" x3 30''x3 30" x3   Wrist Extension Stretch   30" x3 30" x3 30" x3 30''x3 30" x3   UBE  80 rpm  x5'min  Fwd/5'retro 90 rpm  x10 min  fwd 90 rpm  x10 min  fwd 90 rpm  x10 min  fwd 90 rpm  x10 min  fwd   Preacher Curl     Supinated, Neutral, Pronated 15# 2x10 each     Supported Row   30# 2x10 Neutral Overhand   30# 2x10 Neutral Overhand Modalities  2/6 1/23 1/16   CP post tx x10 min    CP post Tx x10 min

## 2020-02-20 ENCOUNTER — OFFICE VISIT (OUTPATIENT)
Dept: PHYSICAL THERAPY | Facility: CLINIC | Age: 55
End: 2020-02-20
Payer: COMMERCIAL

## 2020-02-20 DIAGNOSIS — M77.11 RIGHT LATERAL EPICONDYLITIS: Primary | ICD-10-CM

## 2020-02-20 PROCEDURE — 97110 THERAPEUTIC EXERCISES: CPT

## 2020-02-20 PROCEDURE — 97140 MANUAL THERAPY 1/> REGIONS: CPT

## 2020-02-20 NOTE — PROGRESS NOTES
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  Patient demonstrated fatigue post treatment, exhibited good technique with therapeutic exercises and would benefit from continued PT      Plan: Continue per plan of care        Precautions: None (RA due )      Manual     IASTM to right lateral  epicondyle  X 10  min  Performed   x10 min  performed    x10 min performed   x10 min performed GT 3, 5 x10 min performed                             x10 min  X 10 min  X 10 min  x10 min       Exercise Diary     Pronation Supination with Cane 1#   3x10 1#   2x10 1#   3x10     Wrist Curl With DB  5# 3x10 5# 3x10 5# 3x10 5#   3x10 5# 3x10   Wrist Extension with DB  5# 3x10 5# 3x10 5# 2x10 5# 3x10  5# 3x10   Wrist Roller  3# x5 passes 3# x3 passes 3# x3 passes 3# x3 passes  3# 5 passes   Neutral Bicep Curl  5# 3x10 5# 3x10  5# 3x10 5# 3x10 np   Reverse Bicep Curl  5# 3x10 5# 3x10  5# 3x10 5# 3x10  np   Bicep Curl  5# 3x10 5# 3x10  5# 3x10 5# 3x10 np   Radial/Ulnar Deviation with DB  5# 3x10 5# 2x10 5# x20 5# 3x10  np   TB Wrist Bar         Digiflex          Wrist flexion stretch  30" x3 30" x3 30" x3 30''x3 30" x3   Wrist Extension Stretch   30" x3 30" x3 30" x3 30''x3 30" x3   UBE  80 rpm  x5'min  Fwd/5'retro 90 rpm  x10 min  fwd 90 rpm  x10 min  fwd 90 rpm  x10 min  fwd 90 rpm  x10 min  fwd   Preacher Curl     Supinated, Neutral, Pronated 15# 2x10 each  Supinated, Neutral,  15# 2x10 each   Pronated 15# x12   Supported Row   30# 2x10 Neutral Overhand   30# 2x10 Neutral Overhand 30# 2x10 Neutral Overhand                                               Modalities  2/6 1/23 1/20   CP post tx x10 min    CP post Tx x10 min

## 2020-02-25 ENCOUNTER — EVALUATION (OUTPATIENT)
Dept: PHYSICAL THERAPY | Facility: CLINIC | Age: 55
End: 2020-02-25
Payer: COMMERCIAL

## 2020-02-25 DIAGNOSIS — M77.11 RIGHT LATERAL EPICONDYLITIS: Primary | ICD-10-CM

## 2020-02-25 PROCEDURE — 97140 MANUAL THERAPY 1/> REGIONS: CPT | Performed by: PHYSICAL THERAPIST

## 2020-02-25 PROCEDURE — 97110 THERAPEUTIC EXERCISES: CPT | Performed by: PHYSICAL THERAPIST

## 2020-02-25 NOTE — PROGRESS NOTES
PT Re-Evaluation     Today's date: 2020  Patient name: Navi Neves  : 1965  MRN: [id number]  Referring provider: Zhang Ortega PA-C  Dx:   Encounter Diagnosis     ICD-10-CM    1  Right lateral epicondylitis M77 11                   Assessment  Assessment details: Navi Neves is a 48 y o  male who presents to outpatient PT with a  Right lateral epicondylitis  (primary encounter diagnosis)  No further referral appears necessary at this time based upon examination results  Pt presents with decreased strength, ROM, balance, functional activity tolerance, and pain with movement in his right elbow,  which is  limiting his ability to perform the aforementioned functional activities  Etiologic factors include repetitive poor body mechanics  Prognosis is good given HEP compliance and PT 2/wk  Please contact me if you have any questions or recommendations  Thank you for the opportunity to share in  [facility]  RA      Navi Neves has demonstrated decreased pain, increased strength, increased range of motion, and increased activity tolerance since starting physical therapy services  They report an overall improvement of 80% thus far  He continues  to present with pain, decreased strength, decreased range of motion, and decreased activity tolerance and would benefit from additional skilled physical therapy interventions to address impairments and maximize function  RA      Navi Neves has demonstrated decreased pain, increased strength, increased range of motion, and increased activity tolerance since starting physical therapy services  They report an overall improvement of 90% thus far  At this time, patient has achieved their maximum functional benefit from skilled physical therapy services and will be discharged to their [facility]  Patient is in agreement with the plan of care    As a result, patient is discharged from physical therapy  Pt will be D/C from PT on            Impairments: abnormal muscle firing, abnormal or restricted ROM, activity intolerance, impaired physical strength, lacks appropriate home exercise program and pain with function  Functional limitations: Difficulty with recreational activites, Reaching, lifting, driving, and sleeping  Symptom irritability: moderateUnderstanding of Dx/Px/POC: good   Prognosis: good    Goals  1  In 4-6 weeks, patient will demonstrate a decrease in pain to 0/10 during functional activities  Met   2  In 4-6 weeks, patient will demonstrate an increase in range of motion by 5 degrees  Met   3  In 4-6 weeks, patient will demonstrate an increase in strength by 1/2 grade on MMT  Met     1  In 6-8 weeks, patient will be independent with their home exercise program  Met   2  In 6-8 weeks, patient will have zero limitations with strength  Met   3  In 6-8 weeks, patient will have zero limitations with ROM  Met   4  In 6-8 weeks, patient will have zero limitations with Return to mountain biking   Met    5  In 6-8 weeks, patient will have zero limitations with Sleeping, and driving   Met       Plan  Plan details: Patient was provided a home exercise program and demonstrated an understanding of exercises  Patient was advised to stop performing home exercise program if symptoms increase or new complaints developed  Verbal understanding demonstrated regarding home exercise program instructions  DC PT on 2/27         Subjective Evaluation    History of Present Illness  Date of onset: 5/30/2019  Mechanism of injury: The patient is a 48year old male who presents to outpatient physical therapy with a chief complaint of right elbow pain  Patient reports pain initially started back in May  He is an avid mountain bike rider, and believes the vibration through handle bars, may be contributing to the pain  Reports difficulty with lifting and reaching for objects  RA 1/28     The patient reports an improvement of 80 percent since beginning skilled PT  Reports occasional " twinges" of pain in his lateral right elbow  However, the pain is not constant  He reports relief from IASTM to right lateral elbow  He has not tried mountain biking up to this point  RA     The patient reports an improvement of 90 percent since beginning skilled Physical therapy  Reports he went mountain biking over the past weekend, and had minimal pain in his right elbow  He is planning on joining the gym program after D/C from PT on        Pain                                          Current pain rating: 3                0              0  At best pain rating: 3                 0             0  At worst pain ratin               3             0  Quality: dull ache and discomfort  Relieving factors: change in position, rest, relaxation, support and ice  Aggravating factors: lifting  Progression: no change    Social Support    Employment status: working (Works at a [employer] )  Hand dominance: right    Treatments  Previous treatment: medication  Current treatment: physical therapy  Patient Goals  Patient goals for therapy: increased strength, decreased pain and increased motion  Patient goal: Return to normal level of physical activity  Objective     Active Range of Motion     Left Elbow                                            Continues      Continues   Flexion: WFL  Extension: [facility]  Forearm supination: [facility]  Forearm pronation: WFL    Right Elbow   Flexion: WFL  Extension: WFL  Forearm supination: WFL  Forearm pronation: WFL    Left Wrist   Wrist flexion: WFL  Wrist extension: WFL    Right Wrist   Wrist flexion: WFL  Wrist extension: [facility]    Additional Active Range of Motion Details  Pain and tenderness to right and left Brachioradialis, Lateral epicondyle     Mills Test ( Right ) Positive     Strength/Myotome Testing     Left Elbow   Flexion: 4+  Extension: 4+  Forearm supination: 4+  Forearm pronation: 4+    Right Elbow 1/28 2/25   Flexion: 4+                                                            4+/5                               5/5   Extension: 4+                                                       4+/5           5/5   Forearm supination: 4+ (Pain )                            4+/5 1/10 pain                5/5   Forearm pronation: 4+ (Pain )                             4+/5 1/10 pain                 5/5        Precautions: None (RA due 1/27)      Manual  2/25 2/11 2/13 2/18 2/20   IASTM to right lateral  epicondyle  X 10  min  Performed   x10 min  performed    x10 min performed   x10 min performed GT 3, 5 x10 min performed                             x10 min  X 10 min  X 10 min  x10 min       Exercise Diary  2/25 2/11 2/13 2/18 2/20   Pronation Supination with Cane  1#   2x10 1#   3x10     Wrist Curl With DB  5# 3x10 5# 3x10 5# 3x10 5#   3x10 5# 3x10   Wrist Extension with DB  5# 3x10 5# 3x10 5# 2x10 5# 3x10  5# 3x10   Wrist Roller  3# x5 passes 3# x3 passes 3# x3 passes 3# x3 passes  3# 5 passes   Neutral Bicep Curl  NP 5# 3x10  5# 3x10 5# 3x10 np   Reverse Bicep Curl  NP 5# 3x10  5# 3x10 5# 3x10  np   Bicep Curl  NP  5# 3x10  5# 3x10 5# 3x10 np   Radial/Ulnar Deviation with DB  NP  5# 2x10 5# x20 5# 3x10  np   TB Wrist Bar         Digiflex          Wrist flexion stretch  30" x3 30" x3 30" x3 30''x3 30" x3   Wrist Extension Stretch   30" x3 30" x3 30" x3 30''x3 30" x3   UBE  80 rpm  x5'min  Fwd/5'retro 90 rpm  x10 min  fwd 90 rpm  x10 min  fwd 90 rpm  x10 min  fwd 90 rpm  x10 min  fwd   Preacher Curl  Supinated, Neutral,  15# 2x10 each   Pronated 15# x12   Supinated, Neutral, Pronated 15# 2x10 each  Supinated, Neutral,  15# 2x10 each   Pronated 15# x12   Supported Row   30# 2x10 Neutral Overhand   30# 2x10 Neutral Overhand 30# 2x10 Neutral Overhand                                               Modalities  2/6 1/23 1/20   CP post tx x10 min    CP post Tx x10 min

## 2020-02-25 NOTE — LETTER
2020    Edmund Barrientos, [place]    Patient: Jacey Hill   YOB: 1965   Date of Visit: 2020     Encounter Diagnosis     ICD-10-CM    1  Right lateral epicondylitis M77 11        Dear Dr Genoveva Haas: Thank you for your recent referral of Jacey Hill  Please review the attached evaluation summary from [facility] recent visit  Please verify that you agree with the plan of care by signing the attached order  If you have any questions or concerns, please do not hesitate to call  I sincerely appreciate the opportunity to share in the care of one of your patients and hope to have another opportunity to work with you in the near future  Sincerely,    Katie Jeffers      Referring Provider:      I certify that I have read the below Plan of Care and certify the need for these services furnished under this plan of treatment while under my care  Edmund Barrientos PA-C  VIA Facsimile: [phone]          PT Re-Evaluation     Today's date: 2020  Patient name: Jacey Hill  : 1965  MRN: [id number]  Referring provider: Darlyn Kirkland PA-C  Dx:   Encounter Diagnosis     ICD-10-CM    1  Right lateral epicondylitis M77 11                   Assessment  Assessment details: Jacey Hill is a 48 y o  male who presents to outpatient PT with a  Right lateral epicondylitis  (primary encounter diagnosis)  No further referral appears necessary at this time based upon examination results  Pt presents with decreased strength, ROM, balance, functional activity tolerance, and pain with movement in his right elbow,  which is  limiting his ability to perform the aforementioned functional activities  Etiologic factors include repetitive poor body mechanics  Prognosis is good given HEP compliance and PT 2/wk  Please contact me if you have any questions or recommendations  Thank you for the opportunity to share in  [facility] care       RA      Jacey Hill has demonstrated decreased pain, increased strength, increased range of motion, and increased activity tolerance since starting physical therapy services  They report an overall improvement of 80% thus far  He continues  to present with pain, decreased strength, decreased range of motion, and decreased activity tolerance and would benefit from additional skilled physical therapy interventions to address impairments and maximize function  RA 2/25     Mario Terry has demonstrated decreased pain, increased strength, increased range of motion, and increased activity tolerance since starting physical therapy services  They report an overall improvement of 90% thus far  At this time, patient has achieved their maximum functional benefit from skilled physical therapy services and will be discharged to their [facility]  Patient is in agreement with the plan of care  As a result, patient is discharged from physical therapy  Pt will be D/C from PT on 2/27           Impairments: abnormal muscle firing, abnormal or restricted ROM, activity intolerance, impaired physical strength, lacks appropriate home exercise program and pain with function  Functional limitations: Difficulty with recreational activites, Reaching, lifting, driving, and sleeping  Symptom irritability: moderateUnderstanding of Dx/Px/POC: good   Prognosis: good    Goals  1  In 4-6 weeks, patient will demonstrate a decrease in pain to 0/10 during functional activities  Met   2  In 4-6 weeks, patient will demonstrate an increase in range of motion by 5 degrees  Met   3  In 4-6 weeks, patient will demonstrate an increase in strength by 1/2 grade on MMT  Met     1  In 6-8 weeks, patient will be independent with their home exercise program  Met   2  In 6-8 weeks, patient will have zero limitations with strength  Met   3  In 6-8 weeks, patient will have zero limitations with ROM  Met   4  In 6-8 weeks, patient will have zero limitations with Return to mountain biking   Met    5  In 6-8 weeks, patient will have zero limitations with Sleeping, and driving   Met       Plan  Plan details: Patient was provided a home exercise program and demonstrated an understanding of exercises  Patient was advised to stop performing home exercise program if symptoms increase or new complaints developed  Verbal understanding demonstrated regarding home exercise program instructions  DC PT on          Subjective Evaluation    History of Present Illness  Date of onset: 2019  Mechanism of injury: The patient is a 48year old male who presents to outpatient physical therapy with a chief complaint of right elbow pain  Patient reports pain initially started back in May  He is an avid mountain bike rider, and believes the vibration through handle bars, may be contributing to the pain  Reports difficulty with lifting and reaching for objects  RA      The patient reports an improvement of 80 percent since beginning skilled PT  Reports occasional " twinges" of pain in his lateral right elbow  However, the pain is not constant  He reports relief from IASTM to right lateral elbow  He has not tried mountain biking up to this point  RA     The patient reports an improvement of 90 percent since beginning skilled Physical therapy  Reports he went mountain biking over the past weekend, and had minimal pain in his right elbow   He is planning on joining the gym program after D/C from PT on        Pain                                          Current pain rating: 3                0              0  At best pain rating: 3                 0             0  At worst pain ratin               3             0  Quality: dull ache and discomfort  Relieving factors: change in position, rest, relaxation, support and ice  Aggravating factors: lifting  Progression: no change    Social Support    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    Treatments  Previous treatment: medication  Current treatment: physical therapy  Patient Goals  Patient goals for therapy: increased strength, decreased pain and increased motion  Patient goal: Return to normal level of physical activity  Objective     Active Range of Motion     Left Elbow                                           1/28 Continues     2/25 Continues   Flexion: WFL  Extension: [facility]  Forearm supination: [facility]  Forearm pronation: WFL    Right Elbow   Flexion: WFL  Extension: WFL  Forearm supination: WFL  Forearm pronation: WFL    Left Wrist   Wrist flexion: WFL  Wrist extension: WFL    Right Wrist   Wrist flexion: WFL  Wrist extension: [facility]    Additional Active Range of Motion Details  Pain and tenderness to right and left Brachioradialis, Lateral epicondyle     Mills Test ( Right ) Positive     Strength/Myotome Testing     Left Elbow   Flexion: 4+  Extension: 4+  Forearm supination: 4+  Forearm pronation: 4+    Right Elbow                                                          1/28 2/25   Flexion: 4+                                                            4+/5                               5/5   Extension: 4+                                                       4+/5           5/5   Forearm supination: 4+ (Pain )                            4+/5 1/10 pain                5/5   Forearm pronation: 4+ (Pain )                             4+/5 1/10 pain                 5/5        Precautions: None (RA due 1/27)      Manual  2/25 2/11 2/13 2/18  2/20   IASTM to right lateral  epicondyle  X 10  min  Performed   x10 min  performed    x10 min performed   x10 min performed GT 3, 5 x10 min performed                             x10 min  X 10 min  X 10 min  x10 min       Exercise Diary  2/25 2/11 2/13 2/18  2/20   Pronation Supination with Cane  1#   2x10 1#   3x10     Wrist Curl With DB  5# 3x10 5# 3x10 5# 3x10 5#   3x10 5# 3x10   Wrist Extension with DB  5# 3x10 5# 3x10 5# 2x10 5# 3x10  5# 3x10   Wrist Roller  3# x5 passes 3# x3 passes 3# x3 passes 3# x3 passes  3# 5 passes   Neutral Bicep Curl  NP 5# 3x10  5# 3x10 5# 3x10 np   Reverse Bicep Curl  NP 5# 3x10  5# 3x10 5# 3x10  np   Bicep Curl  NP  5# 3x10  5# 3x10 5# 3x10 np   Radial/Ulnar Deviation with DB  NP  5# 2x10 5# x20 5# 3x10  np   TB Wrist Bar         Digiflex          Wrist flexion stretch  30" x3 30" x3 30" x3 30''x3 30" x3   Wrist Extension Stretch   30" x3 30" x3 30" x3 30''x3 30" x3   UBE  80 rpm  x5'min  Fwd/5'retro 90 rpm  x10 min  fwd 90 rpm  x10 min  fwd 90 rpm  x10 min  fwd 90 rpm  x10 min  fwd   Preacher Curl  Supinated, Neutral,  15# 2x10 each   Pronated 15# x12   Supinated, Neutral, Pronated 15# 2x10 each  Supinated, Neutral,  15# 2x10 each   Pronated 15# x12   Supported Row   30# 2x10 Neutral Overhand   30# 2x10 Neutral Overhand 30# 2x10 Neutral Overhand                                               Modalities  2/6 1/23 1/20   CP post tx x10 min    CP post Tx x10 min

## 2020-02-27 ENCOUNTER — OFFICE VISIT (OUTPATIENT)
Dept: PHYSICAL THERAPY | Facility: CLINIC | Age: 55
End: 2020-02-27
Payer: COMMERCIAL

## 2020-02-27 DIAGNOSIS — M77.11 RIGHT LATERAL EPICONDYLITIS: Primary | ICD-10-CM

## 2020-02-27 PROCEDURE — 97140 MANUAL THERAPY 1/> REGIONS: CPT

## 2020-02-27 PROCEDURE — 97110 THERAPEUTIC EXERCISES: CPT

## 2020-02-27 NOTE — PROGRESS NOTES
Daily Note     Today's date: 2020  Patient name: Lianet Fletcher  : 1965  MRN: [id number]  Referring provider: Benja Juan PA-C  Dx:   Encounter Diagnosis     ICD-10-CM    1  Right lateral epicondylitis M77 11                   Subjective: Pt reports constant ache R elbow is gone now  Anxious to join gym         Objective: See treatment diary below      Assessment: Tolerated treatment well  Pt plans to begin gym maintenance program upon D/c from skilled PT  Patient exhibited good technique with therapeutic exercises      Plan: Pt D/c'd today as per DPT plan       Precautions: None (RA due )      Manual    2   IASTM to right lateral  epicondyle  X 10  min  Performed   x10 min  performed    x10 min performed   x10 min performed GT 3, 5 x10 min performed                             x10 min x10 min X 10 min  X 10 min  x10 min       Exercise Diary    2   Pronation Supination with Cane  1#   2x10 1#   3x10     Wrist Curl With DB  5# 3x10 5# 3x10 5# 3x10 5#   3x10 5# 3x10   Wrist Extension with DB  5# 3x10 5# 3x10 5# 2x10 5# 3x10  5# 3x10   Wrist Roller  3# x5 passes 3# x3 passes 3# x3 passes 3# x3 passes  3# 5 passes   Neutral Bicep Curl  NP 5# 3x10  5# 3x10 5# 3x10 np   Reverse Bicep Curl  NP 5# 3x10  5# 3x10 5# 3x10  np   Bicep Curl  NP  5# 3x10  5# 3x10 5# 3x10 np   Radial/Ulnar Deviation with DB  NP  5# 2x10 5# x20 5# 3x10  np   TB Wrist Bar         Digiflex          Wrist flexion stretch  30" x3 30" x3 30" x3 30''x3 30" x3   Wrist Extension Stretch   30" x3 30" x3 30" x3 30''x3 30" x3   UBE  80 rpm  x5'min  Fwd/5'retro 80 rpm  x10 min  fwd 90 rpm  x10 min  fwd 90 rpm  x10 min  fwd 90 rpm  x10 min  fwd   Preacher Curl  Supinated, Neutral,  15# 2x10 each   Pronated 15# x12 Supinated, Neutral,  15# 2x10 each   Pronated 15# x12  Supinated, Neutral, Pronated 15# 2x10 each  Supinated, Neutral,  15# 2x10 each   Pronated 15# x12   Supported Row   30# 2x10 Neutral Overhand   30# 2x10 Neutral Overhand 30# 2x10 Neutral Overhand                                               Modalities  2/6 2/27 1/20   CP post tx x10 min x10 min   CP post Tx x10 min

## 2020-02-28 ENCOUNTER — TRANSCRIBE ORDERS (OUTPATIENT)
Dept: PHYSICAL THERAPY | Facility: CLINIC | Age: 55
End: 2020-02-28

## 2020-02-28 DIAGNOSIS — M77.11 RIGHT LATERAL EPICONDYLITIS: Primary | ICD-10-CM

## 2020-03-16 NOTE — PROGRESS NOTES
At this time, patient has achieved their maximum functional benefit from skilled physical therapy services and will be discharged to their [facility]  Patient is in agreement with the plan of care    As a result, patient is discharged from physical therapy

## 2020-06-18 ENCOUNTER — EVALUATION (OUTPATIENT)
Dept: PHYSICAL THERAPY | Facility: CLINIC | Age: 55
End: 2020-06-18
Payer: COMMERCIAL

## 2020-06-18 DIAGNOSIS — M77.11 LATERAL EPICONDYLITIS OF RIGHT ELBOW: Primary | ICD-10-CM

## 2020-06-18 PROCEDURE — 97162 PT EVAL MOD COMPLEX 30 MIN: CPT | Performed by: PHYSICAL THERAPIST

## 2020-06-23 ENCOUNTER — TRANSCRIBE ORDERS (OUTPATIENT)
Dept: PHYSICAL THERAPY | Facility: CLINIC | Age: 55
End: 2020-06-23

## 2020-06-23 ENCOUNTER — OFFICE VISIT (OUTPATIENT)
Dept: PHYSICAL THERAPY | Facility: CLINIC | Age: 55
End: 2020-06-23
Payer: COMMERCIAL

## 2020-06-23 DIAGNOSIS — M77.11 LATERAL EPICONDYLITIS OF RIGHT ELBOW: ICD-10-CM

## 2020-06-23 DIAGNOSIS — M77.11 LATERAL EPICONDYLITIS OF RIGHT ELBOW: Primary | ICD-10-CM

## 2020-06-23 DIAGNOSIS — M77.11 RIGHT LATERAL EPICONDYLITIS: Primary | ICD-10-CM

## 2020-06-23 PROCEDURE — 97140 MANUAL THERAPY 1/> REGIONS: CPT | Performed by: PHYSICAL THERAPIST

## 2020-06-23 PROCEDURE — 97110 THERAPEUTIC EXERCISES: CPT | Performed by: PHYSICAL THERAPIST

## 2020-06-24 ENCOUNTER — OFFICE VISIT (OUTPATIENT)
Dept: URGENT CARE | Facility: CLINIC | Age: 55
End: 2020-06-24
Payer: COMMERCIAL

## 2020-06-24 VITALS
TEMPERATURE: 98.8 F | SYSTOLIC BLOOD PRESSURE: 159 MMHG | OXYGEN SATURATION: 97 % | RESPIRATION RATE: 16 BRPM | HEART RATE: 90 BPM | DIASTOLIC BLOOD PRESSURE: 91 MMHG | BODY MASS INDEX: 23.62 KG/M2 | WEIGHT: 165 LBS | HEIGHT: 70 IN

## 2020-06-24 DIAGNOSIS — Z20.822 EXPOSURE TO COVID-19 VIRUS: Primary | ICD-10-CM

## 2020-06-24 DIAGNOSIS — Z11.59 SCREENING FOR VIRAL DISEASE: ICD-10-CM

## 2020-06-24 PROCEDURE — U0003 INFECTIOUS AGENT DETECTION BY NUCLEIC ACID (DNA OR RNA); SEVERE ACUTE RESPIRATORY SYNDROME CORONAVIRUS 2 (SARS-COV-2) (CORONAVIRUS DISEASE [COVID-19]), AMPLIFIED PROBE TECHNIQUE, MAKING USE OF HIGH THROUGHPUT TECHNOLOGIES AS DESCRIBED BY CMS-2020-01-R: HCPCS | Performed by: FAMILY MEDICINE

## 2020-06-24 PROCEDURE — 99213 OFFICE O/P EST LOW 20 MIN: CPT | Performed by: FAMILY MEDICINE

## 2020-06-25 LAB — SARS-COV-2 RNA SPEC QL NAA+PROBE: NOT DETECTED

## 2020-06-26 ENCOUNTER — APPOINTMENT (OUTPATIENT)
Dept: PHYSICAL THERAPY | Facility: CLINIC | Age: 55
End: 2020-06-26
Payer: COMMERCIAL

## 2020-06-26 ENCOUNTER — TELEPHONE (OUTPATIENT)
Dept: URGENT CARE | Facility: CLINIC | Age: 55
End: 2020-06-26

## 2020-06-29 ENCOUNTER — OFFICE VISIT (OUTPATIENT)
Dept: PHYSICAL THERAPY | Facility: CLINIC | Age: 55
End: 2020-06-29
Payer: COMMERCIAL

## 2020-06-29 DIAGNOSIS — M77.11 LATERAL EPICONDYLITIS OF RIGHT ELBOW: Primary | ICD-10-CM

## 2020-06-29 PROCEDURE — 97110 THERAPEUTIC EXERCISES: CPT | Performed by: PHYSICAL THERAPIST

## 2020-06-29 PROCEDURE — 97140 MANUAL THERAPY 1/> REGIONS: CPT | Performed by: PHYSICAL THERAPIST

## 2020-06-30 ENCOUNTER — APPOINTMENT (OUTPATIENT)
Dept: PHYSICAL THERAPY | Facility: CLINIC | Age: 55
End: 2020-06-30
Payer: COMMERCIAL

## 2020-07-01 ENCOUNTER — OFFICE VISIT (OUTPATIENT)
Dept: PHYSICAL THERAPY | Facility: CLINIC | Age: 55
End: 2020-07-01
Payer: COMMERCIAL

## 2020-07-01 DIAGNOSIS — M77.11 LATERAL EPICONDYLITIS OF RIGHT ELBOW: Primary | ICD-10-CM

## 2020-07-01 PROCEDURE — 97140 MANUAL THERAPY 1/> REGIONS: CPT

## 2020-07-01 PROCEDURE — 97110 THERAPEUTIC EXERCISES: CPT

## 2020-07-01 NOTE — PROGRESS NOTES
Daily Note     Today's date: 2020  Patient name: Jeyson Greenberg  : 1965  MRN: [id number]  Referring provider: Rickie Sharma  Dx:   Encounter Diagnosis     ICD-10-CM    1  Lateral epicondylitis of right elbow M77 11                   Subjective: "My elbow is feeling pretty good "      Objective: See treatment diary below      Assessment: Tolerated treatment well  Progressed weight during ulnar and radial deviation to facilitate strength  No exacerbation of symptoms this date  Patient demonstrated fatigue post treatment and would benefit from continued PT       Plan: Continue per plan of care  Precautions: S/P PRP injection right elbow   Initiate AROM then slowly progress strengthening       Manuals      PROM to Right Elbow Perforemd  Performed  x                      10 min  10 min x10 min     Neuro Re-Ed                                                                Ther Ex        Thera band Wrist Bar  Red 30x [id number]  Green FlexBar x30     Digiflex  Blue 5''30x Black 5''30x Black 5" x30     Pronation Supination  Cane 0# 30x each  Cane 1# 30x each  1# Cane x30 ea     Radical/Ulnar Deviation  30x  30x 2#  3# x30 ea     Bicep Curl  30x 3# 30x 5#  5# x30     Tricep Extension  Blue Band 30x Blue 30x Blue TB x30             Wrist Roller   1# 5 passes  1# x5      Wrist Flexion/Extension  2# 30x each  3# 30x  3# x30 ea     UBE   100 RPM 5' fwd/ 5' retro                             Ther Activity                        Gait Training                        Modalities

## 2020-07-02 ENCOUNTER — APPOINTMENT (OUTPATIENT)
Dept: PHYSICAL THERAPY | Facility: CLINIC | Age: 55
End: 2020-07-02
Payer: COMMERCIAL

## 2020-07-06 ENCOUNTER — OFFICE VISIT (OUTPATIENT)
Dept: PHYSICAL THERAPY | Facility: CLINIC | Age: 55
End: 2020-07-06
Payer: COMMERCIAL

## 2020-07-06 DIAGNOSIS — M77.11 LATERAL EPICONDYLITIS OF RIGHT ELBOW: Primary | ICD-10-CM

## 2020-07-06 PROCEDURE — 97140 MANUAL THERAPY 1/> REGIONS: CPT | Performed by: PHYSICAL THERAPIST

## 2020-07-06 PROCEDURE — 97110 THERAPEUTIC EXERCISES: CPT | Performed by: PHYSICAL THERAPIST

## 2020-07-06 NOTE — PROGRESS NOTES
Daily Note     Today's date: 2020  Patient name: Amber Goldberg  : 1965  MRN: [id number]  Referring provider: Galdino Lerma  Dx:   Encounter Diagnosis     ICD-10-CM    1  Lateral epicondylitis of right elbow M77 11                   Subjective: My elbow feels great, I have an appointment to get the lipoma removed on Wednesday  Objective: See treatment diary below      Assessment: Tolerated treatment well  Patient would benefit from continued PT      Plan: Continue per plan of care  Precautions: S/P PRP injection right elbow   Initiate AROM then slowly progress strengthening       Manuals     PROM to Right Elbow Perforemd  Performed  x Performed                      10 min  10 min x10 min x10 min     Neuro Re-Ed                                                                Ther Ex      Thera band Wrist Bar  Red 30x [id number]  Green FlexBar x30 Green FlexBar x 30     Digiflex  Blue 5''30x Black 5''30x Black 5" x30 Black 5''30x     Pronation Supination  Cane 0# 30x each  Cane 1# 30x each  1# Cane x30 ea 1# Cane x 30 each    Radical/Ulnar Deviation  30x  30x 2#  3# x30 ea 3# x 30 each     Bicep Curl  30x 3# 30x 5#  5# x30 5# x30 each     Tricep Extension  Blue Band 30x Blue 30x Blue TB x30 Blue TB x 30             Wrist Roller   1# 5 passes  1# x5  2# x 5 passes     Wrist Flexion/Extension  2# 30x each  3# 30x  3# x30 ea 3# x 30 each     UBE   100 RPM 5' fwd/ 5' retro 120 RPM 5' forward 5'retro                             Ther Activity                        Gait Training                        Modalities

## 2020-07-07 ENCOUNTER — CONSULT (OUTPATIENT)
Dept: SURGERY | Facility: CLINIC | Age: 55
End: 2020-07-07
Payer: COMMERCIAL

## 2020-07-07 VITALS
BODY MASS INDEX: 23.48 KG/M2 | SYSTOLIC BLOOD PRESSURE: 132 MMHG | WEIGHT: 164 LBS | DIASTOLIC BLOOD PRESSURE: 73 MMHG | HEIGHT: 70 IN | HEART RATE: 101 BPM | TEMPERATURE: 98.2 F

## 2020-07-07 DIAGNOSIS — D17.20 LIPOMA OF UPPER EXTREMITY, UNSPECIFIED LATERALITY: Primary | ICD-10-CM

## 2020-07-07 PROCEDURE — 99244 OFF/OP CNSLTJ NEW/EST MOD 40: CPT | Performed by: SURGERY

## 2020-07-08 NOTE — ASSESSMENT & PLAN NOTE
Symptomatic lipomas of bilateral forearms  The right forearm lipoma measures approximately 1 cm and does cause some significant tenderness  The left forearm lesion measures approximately 1 5 cm and is less symptomatic  Patient like this removed  Think this is reasonable  These appear superficial   We will schedule him for an office visit to have these removed

## 2020-07-08 NOTE — PROGRESS NOTES
Assessment/Plan:    Lipoma of bilateral forearm  Symptomatic lipomas of bilateral forearms  The right forearm lipoma measures approximately 1 cm and does cause some significant tenderness  The left forearm lesion measures approximately 1 5 cm and is less symptomatic  Patient like this removed  Think this is reasonable  These appear superficial   We will schedule him for an office visit to have these removed  Diagnoses and all orders for this visit:    Lipoma of upper extremity, unspecified laterality          Subjective:      Patient ID: Amber Goldberg is a 48 y o  male  82-year-old gentleman with no significant past medical history but does present today for evaluation of a subcutaneous mass likely lipoma of the right forearm  Patient states he has had some significant issues with the tendons and the musculature of the right forearm  He has had steroid injections in the elbow region also has undergone physical therapy with some improvement  He now states he has a subcutaneous mass that he has been told is likely lipoma of the proximal forearm on the dorsal aspect  States that occasionally this will flare up in cause him significant pain  In addition he states he also has a lipoma which is less symptomatic on the left forearm on the ventral side  Denies any history of infection  Denies any overlying skin changes  No other associated symptoms  Otherwise healthy  The following portions of the patient's history were reviewed and updated as appropriate:   He  has no past medical history on file  He   Patient Active Problem List    Diagnosis Date Noted    Lipoma of bilateral forearm 07/07/2020     He  has a past surgical history that includes Lumbar discectomy and Facial fracture surgery  His family history is not on file  He  reports that he has quit smoking  He has never used smokeless tobacco  He reports that he drinks alcohol  He reports that he has current or past drug history    Current Outpatient Medications   Medication Sig Dispense Refill    Cetirizine HCl (ZYRTEC PO) Take by mouth      Dexmethylphenidate HCl (FOCALIN PO) Take by mouth      UNKNOWN TO PATIENT        No current facility-administered medications for this visit  He has No Known Allergies       Review of Systems      A review systems was completed, all negative except as noted above HPI  Objective:      /73   Pulse 101   Temp 98 2 °F (36 8 °C)   Ht 5' 10" (1 778 m)   Wt 74 4 kg (164 lb)   BMI 23 53 kg/m²          Physical Exam   Constitutional: He is oriented to person, place, and time  He appears well-developed and well-nourished  No distress  HENT:   Head: Normocephalic and atraumatic  Eyes: No scleral icterus  Neck: Normal range of motion  Neck supple  No tracheal deviation present  Cardiovascular: Normal rate, regular rhythm and normal heart sounds  Exam reveals no gallop and no friction rub  No murmur heard  Pulmonary/Chest: Effort normal and breath sounds normal  No stridor  No respiratory distress  He has no wheezes  He has no rales  He exhibits no tenderness  Abdominal: Soft  He exhibits no distension and no mass  There is no tenderness  There is no guarding  Musculoskeletal: Normal range of motion  He exhibits no edema, tenderness or deformity  Neurological: He is alert and oriented to person, place, and time  No cranial nerve deficit  Coordination normal    Skin: Skin is warm  No rash noted  He is not diaphoretic  No erythema  No pallor  1 cm palpable mobile subcutaneous mass likely consistent with lipoma of the right forearm proximally just distal to the elbow joint on the dorsal aspect  1 5 cm subcutaneous mass likely consistent lipoma in the mid forearm on the left side  Psychiatric: He has a normal mood and affect  His behavior is normal  Judgment and thought content normal    Vitals reviewed

## 2020-07-09 ENCOUNTER — OFFICE VISIT (OUTPATIENT)
Dept: PHYSICAL THERAPY | Facility: CLINIC | Age: 55
End: 2020-07-09
Payer: COMMERCIAL

## 2020-07-09 DIAGNOSIS — M77.11 LATERAL EPICONDYLITIS OF RIGHT ELBOW: Primary | ICD-10-CM

## 2020-07-09 PROCEDURE — 97140 MANUAL THERAPY 1/> REGIONS: CPT | Performed by: PHYSICAL THERAPIST

## 2020-07-09 PROCEDURE — 97110 THERAPEUTIC EXERCISES: CPT | Performed by: PHYSICAL THERAPIST

## 2020-07-09 NOTE — PROGRESS NOTES
Daily Note     Today's date: 2020  Patient name: Ning Hernandez  : 1965  MRN: [id number]  Referring provider: Brigette Butterfield  Dx:   Encounter Diagnosis     ICD-10-CM    1  Lateral epicondylitis of right elbow M77 11                   Subjective: " My elbow feels really good, I am scheduled to get the lipoma removed "        Objective: See treatment diary below      Assessment: Tolerated treatment well  Patient exhibited good technique with therapeutic exercises      Plan: Continue per plan of care  Precautions: S/P PRP injection right elbow   Initiate AROM then slowly progress strengthening       Manuals    PROM to Right Elbow Perforemd  Performed  x Performed  Performed                     10 min  10 min x10 min x10 min  10 min    Neuro Re-Ed                                                                Ther Ex     Thera band Wrist Bar  Red 30x [id number]  Green FlexBar x30 Green FlexBar x 30  Green flexBar x30   Digiflex  Blue 5''30x Black 5''30x Black 5" x30 Black 5''30x  Black 5''30x   Pronation Supination  Cane 0# 30x each  Cane 1# 30x each  1# Cane x30 ea 1# Cane x 30 each 1# cane 30x each    Radical/Ulnar Deviation  30x  30x 2#  3# x30 ea 3# x 30 each  3# x30 each    Bicep Curl  30x 3# 30x 5#  5# x30 5# x30 each  5# x30 each    Tricep Extension  Blue Band 30x Blue 30x Blue TB x30 Blue TB x 30  Blue TB x30            Wrist Roller   1# 5 passes  1# x5  2# x 5 passes  2# x 5 passes    Wrist Flexion/Extension  2# 30x each  3# 30x  3# x30 ea 3# x 30 each  3# x30 each    UBE   100 RPM 5' fwd/ 5' retro 120 RPM 5' forward 5'retro  120 RPM 5' forward 5' retro                           Ther Activity                        Gait Training                        Modalities

## 2020-07-14 ENCOUNTER — OFFICE VISIT (OUTPATIENT)
Dept: PHYSICAL THERAPY | Facility: CLINIC | Age: 55
End: 2020-07-14
Payer: COMMERCIAL

## 2020-07-14 DIAGNOSIS — M77.11 LATERAL EPICONDYLITIS OF RIGHT ELBOW: Primary | ICD-10-CM

## 2020-07-14 PROCEDURE — 97140 MANUAL THERAPY 1/> REGIONS: CPT

## 2020-07-14 PROCEDURE — 97110 THERAPEUTIC EXERCISES: CPT

## 2020-07-14 NOTE — PROGRESS NOTES
Daily Note     Today's date: 2020  Patient name: Ana Ocasio  : 1965  MRN: [id number]  Referring provider: Catrachita Soriano  Dx:   Encounter Diagnosis     ICD-10-CM    1  Lateral epicondylitis of right elbow M77 11                   Subjective: Pt reports that his elbow is feeling "much better " He reports he rode is bike about 8 miles yesterday with no pain  Objective: See treatment diary below      Assessment: Tolerated treatment well  Progressed program by adding weighted machines to facilitate UE strength  Patient demonstrated fatigue post treatment and exhibited good technique with therapeutic exercises      Plan: Continue per plan of care  Progress note during next visit  Potential discharge next visit  Precautions: S/P PRP injection right elbow   Initiate AROM then slowly progress strengthening       Manuals    PROM to Right Elbow Perforemd  Performed  x Performed  Performed                     10 min  10 min x10 min x10 min  10 min    Neuro Re-Ed                                                                Ther Ex     Thera band Wrist Bar  Green 30x [id number]  Green FlexBar x30 Green FlexBar x 30  Green flexBar x30   Digiflex  Black 5''30x Black 5''30x Black 5" x30 Black 5''30x  Black 5''30x   Pronation Supination  Cane 1# 30x each  Cane 1# 30x each  1# Cane x30 ea 1# Cane x 30 each 1# cane 30x each    Radical/Ulnar Deviation  3# 30x  30x 2#  3# x30 ea 3# x 30 each  3# x30 each    Bicep Curl  CC 30# 2x10 30x 5#  5# x30 5# x30 each  5# x30 each    Tricep Extension  CC 30# 2x10 Blue 30x Blue TB x30 Blue TB x 30  Blue TB x30            Wrist Roller  3# x 5 passes  1# 5 passes  1# x5  2# x 5 passes  2# x 5 passes    Wrist Flexion/Extension  3# 30x each  3# 30x  3# x30 ea 3# x 30 each  3# x30 each    UBE 80 RPM 5" fwd/5' retro  100 RPM 5' fwd/ 5' retro 120 RPM 5' forward 5'retro  120 RPM 5' forward 5' retro                           Ther Activity Gait Training                        Modalities

## 2020-07-17 ENCOUNTER — EVALUATION (OUTPATIENT)
Dept: PHYSICAL THERAPY | Facility: CLINIC | Age: 55
End: 2020-07-17
Payer: COMMERCIAL

## 2020-07-17 DIAGNOSIS — M77.11 LATERAL EPICONDYLITIS OF RIGHT ELBOW: Primary | ICD-10-CM

## 2020-07-17 PROCEDURE — 97110 THERAPEUTIC EXERCISES: CPT | Performed by: PHYSICAL THERAPIST

## 2020-07-17 PROCEDURE — 97140 MANUAL THERAPY 1/> REGIONS: CPT | Performed by: PHYSICAL THERAPIST

## 2020-07-17 NOTE — LETTER
2020    Alexander Lacy  [address]  [id number]    Patient: Cesia Rush   YOB: 1965   Date of Visit: 2020     Encounter Diagnosis     ICD-10-CM    1  Lateral epicondylitis of right elbow M77 11        Dear Dr Marti Rahman: Thank you for your recent referral of Cesia Rush  Please review the attached evaluation summary from [facility] recent visit  Please verify that you agree with the plan of care by signing the attached order  If you have any questions or concerns, please do not hesitate to call  I sincerely appreciate the opportunity to share in the care of one of your patients and hope to have another opportunity to work with you in the near future  Sincerely,    Mini Vaughn, PT      Referring Provider:      I certify that I have read the below Plan of Care and certify the need for these services furnished under this plan of treatment while under my care  Alexander Lacy  [address]  [id number] [place] [address]: [phone]          PT Progress Note      Today's date: 2020  Patient name: Cesia Rush  : 1965  MRN: [id number]  Referring provider: Travon Carrasquillo  Dx:   Encounter Diagnosis     ICD-10-CM    1  Lateral epicondylitis of right elbow M77 11                 Assessment  Assessment details: Cesia Rush is a 48 y o  male who presents to outpatient PT with a  Lateral epicondylitis of right elbow  (primary encounter diagnosis)  No further referral appears necessary at this time based upon examination results  Patient is S/P PRP injection in right elbow  Allowed ROM, progressed to early strengthening  Pt presents with decreased strength, ROM, balance, functional activity tolerance, and pain with movement in his right elbow,  which is  limiting his ability to perform the aforementioned functional activities  Etiologic factors include repetitive poor body mechanics   Prognosis is good given HEP compliance and PT 2/wk  Please contact me if you have any questions or recommendations  Thank you for the opportunity to share in  [facility]  RA 7/17     Ciarra Bowers has demonstrated decreased pain, increased strength, increased range of motion, and increased activity tolerance since starting physical therapy services  They report an overall improvement of 90% thus far  Right wrist and Elbow PROM is WFL this date  Progressing well towards PT goals  He continues  to present with pain, decreased strength, decreased range of motion, and decreased activity tolerance and would benefit from additional skilled physical therapy interventions to address impairments and maximize function  Impairments: activity intolerance, impaired physical strength and pain with function    Symptom irritability: moderateUnderstanding of Dx/Px/POC: good   Prognosis: good    Goals  STG to be achieved in 4 weeks     1  Pt will reduce subjective pain rating by at least 50 percent the help facilitate return to PLOF  Met   2  Pt will improve MMT scores by at least 1/2 grade to promote improved functional activity tolerance   Met     1  Pt will be complaint with HEP   2  Pt will improve Right elbow ROM to [facility], to help facilitate independence with ADL's, IADL's, and functional activities   Met   3  Pt will improve Right Elbow strength to [facility] to help facilitate independence with ADL's, IADL's, and functional activities   Met   4  Pt will have no limitations with return to [facility] biking to help facilitate return to PLOF     Progressing           Plan  Patient would benefit from: skilled physical therapy  Planned therapy interventions: ADL training, balance/weight bearing training, flexibility, functional ROM exercises, graded activity, graded exercise, home exercise program, joint mobilization, manual therapy, neuromuscular re-education, patient education, strengthening, stretching, therapeutic activities, therapeutic exercise and therapeutic training  Frequency: 2x week  Duration in weeks: 4  Plan of Care beginning date: 2020  Plan of Care expiration date: 2020  Treatment plan discussed with: patient, PTA and referring physician        Subjective Evaluation    History of Present Illness  Mechanism of injury: The patient is a 48year old male who presents to outpatient PT following PRP injection in right elbow for lateral epicondylitis   Allowed AROM, progressed to early strengthening, as specified in MD Script  RA      The patient reports an improvement of 90 percent since beginning PT  He went for an 8 mile bike ride, and reports that his elbow was pain free  He is getting a lipoma on his arm removed next week  Pain         Current pain rating: 3      0   At best pain ratin      0  At worst pain ratin      0   Relieving factors: change in position, relaxation, rest and support  Progression: improved    Hand dominance: right    Treatments  Previous treatment: immobilization, injection treatment and physical therapy  Current treatment: physical therapy  Patient Goals  Patient goals for therapy: increased strength, decreased pain, increased motion and return to sport/leisure activities  Patient goal: return to [place] biking  Objective     Active Range of Motion      Continues     Right Elbow   Flexion: WFL  Extension: [facility]  Forearm supination: [facility]  Forearm pronation: [facility]    Passive Range of Motion     continues     Right Elbow   Flexion: WFL  Extension: WFL  Forearm supination: [facility]  Forearm pronation: [facility]    Strength/Myotome Testing     Additional Strength Details  Right Elbow Strength NT this date     RA     Flexion Right elbow  4+    Extension Right Elbow 4+     Supination Right Wrist 4+     Pronation Right Wrist 4+    Flexion Right Wrist 4+     Extension Right Wrist 4+        Precautions: S/P PRP injection right elbow   Initiate AROM then slowly progress strengthening       Manuals 7/14 7/17 7/1 7/6 7/9   PROM to Right Elbow Perforemd  Performed  x Performed  Performed                     10 min  10 min x10 min x10 min  10 min    Neuro Re-Ed                                                                Ther Ex 7/14 7/16 7/6 7/9    Thera band Wrist Bar  Green 30x [id number] Green FlexBar x30 Green FlexBar x 30  Green flexBar x30   Digiflex  Black 5''30x Black 5''30x Black 5" x30 Black 5''30x  Black 5''30x   Pronation Supination  Cane 1# 30x each  Cane 1# 30x each  1# Cane x30 ea 1# Cane x 30 each 1# cane 30x each    Radical/Ulnar Deviation  3# 30x  30x 2#  3# x30 ea 3# x 30 each  3# x30 each    Bicep Curl  CC 30# 2x10 30x 6#  5# x30 5# x30 each  5# x30 each    Tricep Extension  CC 30# 2x10 Black  30x Blue TB x30 Blue TB x 30  Blue TB x30            Wrist Roller  3# x 5 passes  3# 5 passes  1# x5  2# x 5 passes  2# x 5 passes    Wrist Flexion/Extension  3# 30x each  3# 30x  3# x30 ea 3# x 30 each  3# x30 each    UBE 80 RPM 5" fwd/5' retro 80 RPM 5" fwd/5' retro 100 RPM 5' fwd/ 5' retro 120 RPM 5' forward 5'retro  120 RPM 5' forward 5' retro                           Ther Activity                        Gait Training                        Modalities

## 2020-07-17 NOTE — PROGRESS NOTES
PT Progress Note      Today's date: 2020  Patient name: Jasbir Ibrahim  : 1965  MRN: [id number]  Referring provider: Rohan Carreon  Dx:   Encounter Diagnosis     ICD-10-CM    1  Lateral epicondylitis of right elbow M77 11                 Assessment  Assessment details: Jasbir Ibrahim is a 48 y o  male who presents to outpatient PT with a  Lateral epicondylitis of right elbow  (primary encounter diagnosis)  No further referral appears necessary at this time based upon examination results  Patient is S/P PRP injection in right elbow  Allowed ROM, progressed to early strengthening  Pt presents with decreased strength, ROM, balance, functional activity tolerance, and pain with movement in his right elbow,  which is  limiting his ability to perform the aforementioned functional activities  Etiologic factors include repetitive poor body mechanics  Prognosis is good given HEP compliance and PT 2/wk  Please contact me if you have any questions or recommendations  Thank you for the opportunity to share in  [facility]  RA      Jasbir Ibrahim has demonstrated decreased pain, increased strength, increased range of motion, and increased activity tolerance since starting physical therapy services  They report an overall improvement of 90% thus far  Right wrist and Elbow PROM is WFL this date  Progressing well towards PT goals  He continues  to present with pain, decreased strength, decreased range of motion, and decreased activity tolerance and would benefit from additional skilled physical therapy interventions to address impairments and maximize function  Impairments: activity intolerance, impaired physical strength and pain with function    Symptom irritability: moderateUnderstanding of Dx/Px/POC: good   Prognosis: good    Goals  STG to be achieved in 4 weeks     1  Pt will reduce subjective pain rating by at least 50 percent the help facilitate return to PLOF  Met   2   Pt will improve MMT scores by at least 1/2 grade to promote improved functional activity tolerance   Met     1  Pt will be complaint with HEP   2  Pt will improve Right elbow ROM to [facility], to help facilitate independence with ADL's, IADL's, and functional activities   Met   3  Pt will improve Right Elbow strength to [facility] to help facilitate independence with ADL's, IADL's, and functional activities   Met   4  Pt will have no limitations with return to [facility] biking to help facilitate return to PLOF  Progressing           Plan  Patient would benefit from: skilled physical therapy  Planned therapy interventions: ADL training, balance/weight bearing training, flexibility, functional ROM exercises, graded activity, graded exercise, home exercise program, joint mobilization, manual therapy, neuromuscular re-education, patient education, strengthening, stretching, therapeutic activities, therapeutic exercise and therapeutic training  Frequency: 2x week  Duration in weeks: 4  Plan of Care beginning date: 2020  Plan of Care expiration date: 2020  Treatment plan discussed with: patient, PTA and referring physician        Subjective Evaluation    History of Present Illness  Mechanism of injury: The patient is a 48year old male who presents to outpatient PT following PRP injection in right elbow for lateral epicondylitis   Allowed AROM, progressed to early strengthening, as specified in MD Script  RA      The patient reports an improvement of 90 percent since beginning PT  He went for an 8 mile bike ride, and reports that his elbow was pain free  He is getting a lipoma on his arm removed next week     Pain         Current pain rating: 3      0   At best pain ratin      0  At worst pain ratin      0   Relieving factors: change in position, relaxation, rest and support  Progression: improved    Hand dominance: right    Treatments  Previous treatment: immobilization, injection treatment and physical therapy  Current treatment: physical therapy  Patient Goals  Patient goals for therapy: increased strength, decreased pain, increased motion and return to sport/leisure activities  Patient goal: return to [place] biking  Objective     Active Range of Motion    7/17  Continues     Right Elbow   Flexion: WFL  Extension: [facility]  Forearm supination: [facility]  Forearm pronation: [facility]    Passive Range of Motion    7/17 continues     Right Elbow   Flexion: WFL  Extension: WFL  Forearm supination: [facility]  Forearm pronation: [facility]    Strength/Myotome Testing     Additional Strength Details  Right Elbow Strength NT this date     RA 7/17    Flexion Right elbow  4+    Extension Right Elbow 4+     Supination Right Wrist 4+     Pronation Right Wrist 4+    Flexion Right Wrist 4+     Extension Right Wrist 4+        Precautions: S/P PRP injection right elbow 5/27  Initiate AROM then slowly progress strengthening       Manuals 7/14 7/17 7/1 7/6 7/9   PROM to Right Elbow Perforemd  Performed  x Performed  Performed                     10 min  10 min x10 min x10 min  10 min    Neuro Re-Ed                                                                Ther Ex 7/14 7/16 7/6 7/9    Thera band Wrist Bar  Green 30x [id number] Green FlexBar x30 Green FlexBar x 30  Green flexBar x30   Digiflex  Black 5''30x Black 5''30x Black 5" x30 Black 5''30x  Black 5''30x   Pronation Supination  Cane 1# 30x each  Cane 1# 30x each  1# Cane x30 ea 1# Cane x 30 each 1# cane 30x each    Radical/Ulnar Deviation  3# 30x  30x 2#  3# x30 ea 3# x 30 each  3# x30 each    Bicep Curl  CC 30# 2x10 30x 6#  5# x30 5# x30 each  5# x30 each    Tricep Extension  CC 30# 2x10 Black  30x Blue TB x30 Blue TB x 30  Blue TB x30            Wrist Roller  3# x 5 passes  3# 5 passes  1# x5  2# x 5 passes  2# x 5 passes    Wrist Flexion/Extension  3# 30x each  3# 30x  3# x30 ea 3# x 30 each  3# x30 each    UBE 80 RPM 5" fwd/5' retro 80 RPM 5" fwd/5' retro 100 RPM 5' fwd/ 5' retro 120 RPM 5' forward 5'retro  120 RPM 5' forward 5' retro                           Ther Activity                        Gait Training                        Modalities

## 2020-07-20 ENCOUNTER — TRANSCRIBE ORDERS (OUTPATIENT)
Dept: PHYSICAL THERAPY | Facility: CLINIC | Age: 55
End: 2020-07-20

## 2020-07-20 DIAGNOSIS — M77.11 RIGHT LATERAL EPICONDYLITIS: Primary | ICD-10-CM

## 2020-07-21 ENCOUNTER — OFFICE VISIT (OUTPATIENT)
Dept: PHYSICAL THERAPY | Facility: CLINIC | Age: 55
End: 2020-07-21
Payer: COMMERCIAL

## 2020-07-21 DIAGNOSIS — M77.11 LATERAL EPICONDYLITIS OF RIGHT ELBOW: Primary | ICD-10-CM

## 2020-07-21 PROCEDURE — 97110 THERAPEUTIC EXERCISES: CPT

## 2020-07-21 NOTE — PROGRESS NOTES
Daily Note     Today's date: 2020  Patient name: Darin Long  : 1965  MRN: [id number]  Referring provider: Kurtis Toth  Dx:   Encounter Diagnosis     ICD-10-CM    1  Lateral epicondylitis of right elbow M77 11                   Subjective: Pt reports R elbow is feeling good  Was even able to bike without much discomfort  States he is having R forearm lipoma removed tomorrow  Objective: See treatment diary below      Assessment: Tolerated treatment well  No noted limitation with R elbow ROM  Patient exhibited good technique with therapeutic exercises and would benefit from continued PT      Plan: Continue per plan of care  Precautions: S/P PRP injection right elbow   Initiate AROM then slowly progress strengthening       Manuals    PROM to Right Elbow Perforemd  Performed  performed Performed  Performed                     10 min  10 min x5 min x10 min  10 min    Neuro Re-Ed                                                                Ther Ex     Thera band Wrist Bar  Green 30x [id number] Green FlexBar x30 Green FlexBar x 30  Green flexBar x30   Digiflex  Black 5''30x Black 5''30x Black 5" x30 Black 5''30x  Black 5''30x   Pronation Supination  Cane 1# 30x each  Cane 1# 30x each  1# Cane x30 ea 1# Cane x 30 each 1# cane 30x each    Radical/Ulnar Deviation  3# 30x  30x 2#  3# x30 ea 3# x 30 each  3# x30 each    Bicep Curl  CC 30# 2x10 30x 6#  6# x30ea 5# x30 each  5# x30 each    Tricep Extension  CC 30# 2x10 Black  30x black TB x30 Blue TB x 30  Blue TB x30            Wrist Roller  3# x 5 passes  3# 5 passes  3# x5  2# x 5 passes  2# x 5 passes    Wrist Flexion/Extension  3# 30x each  3# 30x  3# x30 ea 3# x 30 each  3# x30 each    UBE 80 RPM 5" fwd/5' retro 80 RPM 5" fwd/5' retro 80 RPM 5' fwd/ 5' retro 120 RPM 5' forward 5'retro  120 RPM 5' forward 5' retro                           Ther Activity                        Gait Training Modalities

## 2020-07-22 ENCOUNTER — APPOINTMENT (OUTPATIENT)
Dept: LAB | Facility: HOSPITAL | Age: 55
End: 2020-07-22
Attending: SURGERY
Payer: COMMERCIAL

## 2020-07-22 ENCOUNTER — PROCEDURE VISIT (OUTPATIENT)
Dept: SURGERY | Facility: CLINIC | Age: 55
End: 2020-07-22
Payer: COMMERCIAL

## 2020-07-22 VITALS
SYSTOLIC BLOOD PRESSURE: 119 MMHG | HEIGHT: 70 IN | BODY MASS INDEX: 22.9 KG/M2 | TEMPERATURE: 98 F | WEIGHT: 160 LBS | DIASTOLIC BLOOD PRESSURE: 84 MMHG | HEART RATE: 78 BPM

## 2020-07-22 DIAGNOSIS — D17.20 LIPOMA OF UPPER EXTREMITY, UNSPECIFIED LATERALITY: Primary | ICD-10-CM

## 2020-07-22 DIAGNOSIS — D17.20 LIPOMA OF UPPER EXTREMITY, UNSPECIFIED LATERALITY: ICD-10-CM

## 2020-07-22 PROCEDURE — 88304 TISSUE EXAM BY PATHOLOGIST: CPT | Performed by: PATHOLOGY

## 2020-07-22 PROCEDURE — 11401 EXC TR-EXT B9+MARG 0.6-1 CM: CPT | Performed by: SURGERY

## 2020-07-22 PROCEDURE — 11402 EXC TR-EXT B9+MARG 1.1-2 CM: CPT | Performed by: SURGERY

## 2020-07-22 NOTE — PROGRESS NOTES
Assessment/Plan:    Lipoma of bilateral forearm  Presented with a bilateral subcutaneous masses for excision in the office  Excision of bilateral subcutaneous masses undertaken and revealed likely lipomatous lesion  Patient tolerated procedure well  Will await pathology  Follow-up in 2 weeks for suture removal        Diagnoses and all orders for this visit:    Lipoma of upper extremity, unspecified laterality          Subjective:      Patient ID: Jeyson Greenberg is a 48 y o  male  44-year-old gentleman with a known history of bilateral subcutaneous masses of the forearm, presents today for excision  Doing well  No changes in either lesion of bilateral forearm  No fevers or chills  The following portions of the patient's history were reviewed and updated as appropriate:   He  has no past medical history on file  He   Patient Active Problem List    Diagnosis Date Noted    Lipoma of bilateral forearm 07/07/2020     He  has a past surgical history that includes Lumbar discectomy and Facial fracture surgery  His family history is not on file  He  reports that he has quit smoking  He has never used smokeless tobacco  He reports that he drinks alcohol  He reports that he has current or past drug history  Current Outpatient Medications   Medication Sig Dispense Refill    Cetirizine HCl (ZYRTEC PO) Take by mouth      Dexmethylphenidate HCl (FOCALIN PO) Take by mouth      UNKNOWN TO PATIENT        No current facility-administered medications for this visit  He has No Known Allergies       Review of Systems   Constitutional: Negative for activity change, appetite change, chills, diaphoresis, fatigue, fever and unexpected weight change  Skin: Negative for color change, pallor, rash and wound          Bilateral subcutaneous masses         Objective:      /84   Pulse 78   Temp 98 °F (36 7 °C)   Ht 5' 10" (1 778 m)   Wt 72 6 kg (160 lb)   BMI 22 96 kg/m²            Physical Exam   Constitutional: He appears well-developed and well-nourished  No distress  Skin: Skin is warm  No rash noted  He is not diaphoretic  No erythema  No pallor  Palpable 1 cm mass of the right forearm  Addition there is a palpable 1 5 cm mass of the left forearm  Vitals reviewed  Skin excision  Date/Time: 7/22/2020 9:30 AM  Performed by: Igor Borden DO  Authorized by: Igor Borden DO     Procedure Details - Skin Excision:     Number of Lesions:  2  Lesion 1:     Body area:  Upper extremity    Upper extremity location:  R lower arm    Initial size (mm):  10       Final defect size (mm):  10    Malignancy: benign lesion      Repair type:  Linear closure    Closure complexity: simple      Surgical defect:  1cm    Repair size (cm):  1     Prior to procedure and the procedure the procedure self including all associated risks and benefits were discussed the patient great length  Patient verbalized understands risks is willing proceed, consent was signed  Right forearm was prepped draped usual sterile fashion  Time-out was performed to verify correct patient, procedure, position, site  A 1 cm incision was made overlying the palpable mass  Incision was carried down to the subcutaneous fat using 15 blade scalpel  A lipomatous type lesion was in clear view  With gentle pressure the subcutaneous mass popped right out  Lesion measured approximately 1 cm  It was grasped and any additional adhesions were taken down  Hemostasis was achieved with silver nitrate and direct pressure  Two nylon sutures were then placed  Dry dry sterile dressing placed  Patient tolerated procedure well    Lesion 2:     Body area:  Upper extremity    Upper extremity location:  L lower arm        Initial size (mm):  15    Final defect size (mm):  15    Malignancy: benign lesion      Repair type:  Linear closure    Closure complexity: simple      Surgical Defect:  1cm    Repair size (cm):  1     Prior to procedure and the procedure the procedure self including all associated risks and benefits were discussed the patient great length  Patient verbalized understands risks is willing proceed, consent was signed  Left forearm was prepped draped usual sterile fashion  Time-out was performed to verify correct patient, procedure, position, site  A 1 cm incision was made overlying the palpable mass  Incision was carried down to the subcutaneous fat using 15 blade scalpel  A lipomatous type lesion was in clear view  With gentle pressure the subcutaneous mass popped right out  Lesion measured approximately 1 5 cm  It was grasped and any additional adhesions were taken down  Hemostasis was achieved with silver nitrate and direct pressure  Two nylon sutures were then placed  Dry dry sterile dressing placed  Patient tolerated procedure well

## 2020-07-22 NOTE — ASSESSMENT & PLAN NOTE
Presented with a bilateral subcutaneous masses for excision in the office  Excision of bilateral subcutaneous masses undertaken and revealed likely lipomatous lesion  Patient tolerated procedure well  Will await pathology    Follow-up in 2 weeks for suture removal

## 2020-07-24 ENCOUNTER — APPOINTMENT (OUTPATIENT)
Dept: PHYSICAL THERAPY | Facility: CLINIC | Age: 55
End: 2020-07-24
Payer: COMMERCIAL

## 2020-08-10 ENCOUNTER — OFFICE VISIT (OUTPATIENT)
Dept: SURGERY | Facility: CLINIC | Age: 55
End: 2020-08-10
Payer: COMMERCIAL

## 2020-08-10 VITALS
SYSTOLIC BLOOD PRESSURE: 135 MMHG | HEIGHT: 70 IN | DIASTOLIC BLOOD PRESSURE: 87 MMHG | TEMPERATURE: 98 F | HEART RATE: 66 BPM | BODY MASS INDEX: 22.48 KG/M2 | WEIGHT: 157 LBS

## 2020-08-10 DIAGNOSIS — Z48.02 VISIT FOR SUTURE REMOVAL: Primary | ICD-10-CM

## 2020-08-10 PROCEDURE — 99213 OFFICE O/P EST LOW 20 MIN: CPT | Performed by: SURGERY

## 2020-08-10 NOTE — PROGRESS NOTES
Assessment/Plan/Follow up information       Diagnosis ICD-10-CM Associated Orders   1  Visit for suture removal  Z48 02             All recent lab work and imagining reviewed on today's visit with patient, appropriate follow up was initiated if needed  Patient was counseled/education regarding their diagnosis, and the associated plan  They agreed with plan, all questions and concerns were answered/addressed  Advised to contact me or the office with any concerns or questions  In the event of an emergency, and unable to contact a provider they are to go to the emergency room  Subjective    HPI:  53-year-old male presents to the office for follow-up for bilateral lipomas removed approximately 2 weeks ago  Patient states he has been doing well was informed by Dr Chiquita Bhakta that the lipomas were benign is presenting here today for suture removal   Patient states approximately 10 days ago he became concerned that the left incisional site was becoming infected as it was warm tender swollen, he discuss this with his friend who he states was a doctor who had prescribed him a 7 day course of Keflex with complete resolution of symptoms  Other than the above patient states he tolerated the procedure well and had no other complications  He is happy with the results      Review of Systems   Constitutional: Negative for activity change, appetite change, chills, fatigue and fever  HENT: Negative for congestion, dental problem, drooling, ear discharge, ear pain, facial swelling, postnasal drip, rhinorrhea and sinus pain  Eyes: Negative for photophobia, pain, discharge and itching  Respiratory: Negative for apnea, cough, chest tightness and shortness of breath  Cardiovascular: Negative for chest pain and leg swelling  Gastrointestinal: Negative for abdominal distention, abdominal pain, anal bleeding, constipation, diarrhea and nausea     Endocrine: Negative for cold intolerance, heat intolerance and polydipsia  Genitourinary: Negative for difficulty urinating  Musculoskeletal: Negative for arthralgias, gait problem, joint swelling and myalgias  Skin: Negative for color change and pallor  Allergic/Immunologic: Negative for immunocompromised state  Neurological: Negative for dizziness, seizures, facial asymmetry, weakness, light-headedness, numbness and headaches  Psychiatric/Behavioral: Negative for agitation, behavioral problems, confusion, decreased concentration and dysphoric mood  Objective    Vitals:    08/10/20 1226   BP: 135/87   Pulse: 66   Temp: 98 °F (36 7 °C)      Suture removal    Date/Time: 8/10/2020 12:33 PM  Performed by: Arielle June MD  Authorized by: Arielle June MD     Patient location:  Clinic  Other Assisting Provider: No    Consent:     Consent obtained:  Verbal    Consent given by:  Patient    Risks discussed:  Bleeding, pain and wound separation    Alternatives discussed:  No treatment  Universal protocol:     Procedure explained and questions answered to patient or proxy's satisfaction: yes      Relevant documents present and verified: yes      Test results available and properly labeled: yes      Radiology Images displayed and confirmed  If images not available, report reviewed: yes      Required blood products, implants, devices, and special equipment available: yes      Site/side marked: yes      Immediately prior to procedure, a time out was called: yes      Patient identity confirmed:  Verbally with patient  Location:     Laterality:  Bilateral    Location:  Upper extremity    Upper extremity location:  Arm    Arm location:  L lower arm and R lower arm  Procedure details:      Tools used:  Suture removal kit    Wound appearance:  No sign(s) of infection, draining, clean and pink    Drainage characteristics:  Yellow sanguinous fluid consistent with fat necrosis    Number of sutures removed:  4  Post-procedure details:     Post-removal:  Band-Aid applied  Comments:      Two sutures removed from right upper extremity, 2 sutures removed from the left upper extremity          Physical Exam  Vitals signs and nursing note reviewed  Constitutional:       Appearance: Normal appearance  He is well-developed  HENT:      Head: Normocephalic  Eyes:      Pupils: Pupils are equal, round, and reactive to light  Neck:      Musculoskeletal: Normal range of motion and neck supple  Cardiovascular:      Rate and Rhythm: Normal rate and regular rhythm  Pulmonary:      Effort: Pulmonary effort is normal       Breath sounds: Normal breath sounds  Abdominal:      General: Bowel sounds are normal       Palpations: Abdomen is soft  Musculoskeletal: Normal range of motion  Arms:       Comments: Surgical scars as noted consistent with lipoma removal performed 2 weeks ago, scant yellowish drainage consistent with fat necrosis  No active signs of infection well-appearing well-healing incisions   Skin:     General: Skin is warm  Neurological:      Mental Status: He is alert  Portions of the record may have been created with voice recognition software  Occasional wrong word or "sound a like" substitutions may have occurred due to the inherent limitations of voice recognition software  Read the chart carefully and recognize, using context, where substitutions have occurred  Contact me with any questions         Aretha Gaspar MD 08/10/20

## 2020-08-18 NOTE — PROGRESS NOTES
Patient stopped coming to physical therapy, on 7/21, due to having a lipoma removed  measurements are from the last patient progress note and were unable to be updated  As a result, patient is discharged from physical therapy

## 2020-10-07 ENCOUNTER — APPOINTMENT (OUTPATIENT)
Dept: LAB | Facility: MEDICAL CENTER | Age: 55
End: 2020-10-07
Payer: COMMERCIAL

## 2020-10-07 ENCOUNTER — OFFICE VISIT (OUTPATIENT)
Dept: OBGYN CLINIC | Facility: MEDICAL CENTER | Age: 55
End: 2020-10-07
Payer: COMMERCIAL

## 2020-10-07 ENCOUNTER — APPOINTMENT (OUTPATIENT)
Dept: RADIOLOGY | Facility: MEDICAL CENTER | Age: 55
End: 2020-10-07
Payer: COMMERCIAL

## 2020-10-07 VITALS
SYSTOLIC BLOOD PRESSURE: 138 MMHG | BODY MASS INDEX: 24.05 KG/M2 | HEART RATE: 91 BPM | HEIGHT: 70 IN | WEIGHT: 168 LBS | DIASTOLIC BLOOD PRESSURE: 81 MMHG | TEMPERATURE: 98.3 F

## 2020-10-07 DIAGNOSIS — M25.461 EFFUSION OF RIGHT KNEE: ICD-10-CM

## 2020-10-07 DIAGNOSIS — M25.561 RIGHT KNEE PAIN, UNSPECIFIED CHRONICITY: ICD-10-CM

## 2020-10-07 DIAGNOSIS — M71.21 BAKER CYST, RIGHT: ICD-10-CM

## 2020-10-07 DIAGNOSIS — M25.561 RIGHT KNEE PAIN, UNSPECIFIED CHRONICITY: Primary | ICD-10-CM

## 2020-10-07 LAB — CRP SERPL QL: <3 MG/L

## 2020-10-07 PROCEDURE — 36415 COLL VENOUS BLD VENIPUNCTURE: CPT

## 2020-10-07 PROCEDURE — 20610 DRAIN/INJ JOINT/BURSA W/O US: CPT | Performed by: EMERGENCY MEDICINE

## 2020-10-07 PROCEDURE — 86430 RHEUMATOID FACTOR TEST QUAL: CPT

## 2020-10-07 PROCEDURE — 86038 ANTINUCLEAR ANTIBODIES: CPT

## 2020-10-07 PROCEDURE — 86140 C-REACTIVE PROTEIN: CPT

## 2020-10-07 PROCEDURE — 86618 LYME DISEASE ANTIBODY: CPT

## 2020-10-07 PROCEDURE — 99203 OFFICE O/P NEW LOW 30 MIN: CPT | Performed by: EMERGENCY MEDICINE

## 2020-10-07 PROCEDURE — 73562 X-RAY EXAM OF KNEE 3: CPT

## 2020-10-08 LAB
B BURGDOR IGG+IGM SER-ACNC: <0.91 ISR (ref 0–0.9)
RHEUMATOID FACT SER QL LA: NEGATIVE

## 2020-10-09 LAB — RYE IGE QN: NEGATIVE

## 2021-10-13 ENCOUNTER — OFFICE VISIT (OUTPATIENT)
Dept: URGENT CARE | Facility: CLINIC | Age: 56
End: 2021-10-13
Payer: COMMERCIAL

## 2021-10-13 VITALS — HEART RATE: 82 BPM | OXYGEN SATURATION: 98 % | RESPIRATION RATE: 18 BRPM | TEMPERATURE: 99.1 F

## 2021-10-13 DIAGNOSIS — R50.81 FEVER IN OTHER DISEASES: ICD-10-CM

## 2021-10-13 DIAGNOSIS — J01.80 OTHER ACUTE SINUSITIS, RECURRENCE NOT SPECIFIED: Primary | ICD-10-CM

## 2021-10-13 PROCEDURE — S9088 SERVICES PROVIDED IN URGENT: HCPCS | Performed by: NURSE PRACTITIONER

## 2021-10-13 PROCEDURE — U0005 INFEC AGEN DETEC AMPLI PROBE: HCPCS | Performed by: NURSE PRACTITIONER

## 2021-10-13 PROCEDURE — 99213 OFFICE O/P EST LOW 20 MIN: CPT | Performed by: NURSE PRACTITIONER

## 2021-10-13 PROCEDURE — U0003 INFECTIOUS AGENT DETECTION BY NUCLEIC ACID (DNA OR RNA); SEVERE ACUTE RESPIRATORY SYNDROME CORONAVIRUS 2 (SARS-COV-2) (CORONAVIRUS DISEASE [COVID-19]), AMPLIFIED PROBE TECHNIQUE, MAKING USE OF HIGH THROUGHPUT TECHNOLOGIES AS DESCRIBED BY CMS-2020-01-R: HCPCS | Performed by: NURSE PRACTITIONER

## 2021-10-13 RX ORDER — DOXYCYCLINE 100 MG/1
100 CAPSULE ORAL 2 TIMES DAILY
Qty: 14 CAPSULE | Refills: 0 | Status: SHIPPED | OUTPATIENT
Start: 2021-10-13 | End: 2021-10-20

## 2021-10-13 RX ORDER — FLUTICASONE PROPIONATE 50 MCG
1 SPRAY, SUSPENSION (ML) NASAL DAILY
Qty: 9 ML | Refills: 0 | Status: SHIPPED | OUTPATIENT
Start: 2021-10-13

## 2021-10-14 LAB — SARS-COV-2 RNA RESP QL NAA+PROBE: POSITIVE

## 2022-05-03 ENCOUNTER — APPOINTMENT (OUTPATIENT)
Dept: LAB | Facility: HOSPITAL | Age: 57
End: 2022-05-03
Payer: COMMERCIAL

## 2022-05-03 DIAGNOSIS — E29.1 3-OXO-5 ALPHA-STEROID DELTA 4-DEHYDROGENASE DEFICIENCY: ICD-10-CM

## 2022-05-03 LAB
FSH SERPL-ACNC: 3.2 MIU/ML (ref 0.7–10.8)
LH SERPL-ACNC: 3.7 MIU/ML (ref 1.2–10.6)

## 2022-05-03 PROCEDURE — 83001 ASSAY OF GONADOTROPIN (FSH): CPT

## 2022-05-03 PROCEDURE — 83002 ASSAY OF GONADOTROPIN (LH): CPT

## 2022-05-03 PROCEDURE — 84402 ASSAY OF FREE TESTOSTERONE: CPT

## 2022-05-03 PROCEDURE — 84403 ASSAY OF TOTAL TESTOSTERONE: CPT

## 2022-05-03 PROCEDURE — 36415 COLL VENOUS BLD VENIPUNCTURE: CPT

## 2022-05-04 LAB
TESTOST FREE SERPL-MCNC: 7.9 PG/ML (ref 7.2–24)
TESTOST SERPL-MCNC: 519 NG/DL (ref 264–916)

## 2023-07-05 ENCOUNTER — OFFICE VISIT (OUTPATIENT)
Dept: URGENT CARE | Facility: CLINIC | Age: 58
End: 2023-07-05
Payer: COMMERCIAL

## 2023-07-05 VITALS — TEMPERATURE: 97.9 F | OXYGEN SATURATION: 97 % | HEART RATE: 77 BPM | RESPIRATION RATE: 18 BRPM

## 2023-07-05 DIAGNOSIS — J02.9 SORE THROAT: ICD-10-CM

## 2023-07-05 DIAGNOSIS — J02.0 STREP PHARYNGITIS: Primary | ICD-10-CM

## 2023-07-05 LAB — S PYO AG THROAT QL: POSITIVE

## 2023-07-05 PROCEDURE — 87880 STREP A ASSAY W/OPTIC: CPT | Performed by: NURSE PRACTITIONER

## 2023-07-05 PROCEDURE — 99214 OFFICE O/P EST MOD 30 MIN: CPT | Performed by: NURSE PRACTITIONER

## 2023-07-05 RX ORDER — PENICILLIN V POTASSIUM 500 MG/1
500 TABLET ORAL 2 TIMES DAILY
Qty: 20 TABLET | Refills: 0 | Status: SHIPPED | OUTPATIENT
Start: 2023-07-05 | End: 2023-07-15

## 2023-07-05 NOTE — PATIENT INSTRUCTIONS
Your strep A positive. You have been prescribed penicillin   You are to do warm salt water gargles 4 x daily. Drink warm tea with honey and lemon. Take tylenol or motrin as able for pain or fever. Chloraseptic throat spray, cough drops. Do not share utensils. Change your tooth brush in 3 days.   Follow up with your PCP in 2-3 days  Go to the ED if symptoms worsen

## 2023-07-05 NOTE — PROGRESS NOTES
[facility]        NAME: Chelly Avalos is a 56 y.o. male  : 1965    MRN: [id number]  DATE: 2023  TIME: 9:06 AM    Assessment and Plan   Strep pharyngitis [J02.0]  1. Strep pharyngitis  penicillin V potassium (VEETID) 500 mg tablet      2. Sore throat  POCT rapid strepA    penicillin V potassium (VEETID) 500 mg tablet          Patient Instructions   Your strep A positive. You have been prescribed penicillin   You are to do warm salt water gargles 4 x daily. Drink warm tea with honey and lemon. Take tylenol or motrin as able for pain or fever. Chloraseptic throat spray, cough drops. Do not share utensils. Change your tooth brush in 3 days. Follow up with your PCP in 2-3 days  Go to the ED if symptoms worsen      Chief Complaint     Chief Complaint   Patient presents with   • Sore Throat     4 days         History of Present Illness       57-year-old male presents for evaluation of a sore throat that started 4-5 days ago. He reports chills, low grade fever of 99 last night. He took Tylenol PM and Nyquil last night with some relief. Prior history of strep throat years ago, feels similar. Review of Systems   Review of Systems   Constitutional: Positive for chills and fever. HENT: Positive for rhinorrhea and sore throat. Eyes: Negative. Respiratory: Negative. Cardiovascular: Negative. Gastrointestinal: Negative. Endocrine: Negative. Genitourinary: Negative. Musculoskeletal: Negative. Skin: Negative. Allergic/Immunologic: Negative. Neurological: Negative. Hematological: Negative. Psychiatric/Behavioral: Negative.           Current Medications       Current Outpatient Medications:   •  Cetirizine HCl (ZYRTEC PO), Take by mouth, Disp: , Rfl:   •  penicillin V potassium (VEETID) 500 mg tablet, Take 1 tablet (500 mg total) by mouth 2 (two) times a day for 10 days, Disp: 20 tablet, Rfl: 0  •  Dexmethylphenidate HCl (FOCALIN PO), Take by mouth (Patient not taking: Reported on 7/5/2023), Disp: , Rfl:   •  fluticasone (FLONASE) 50 mcg/act nasal spray, 1 spray into each nostril daily (Patient not taking: Reported on 7/5/2023), Disp: 9 mL, Rfl: 0  •  UNKNOWN TO PATIENT, , Disp: , Rfl:     Current Allergies     Allergies as of 07/05/2023   • (No Known Allergies)            The following portions of the patient's history were reviewed and updated as appropriate: allergies, current medications, past family history, past medical history, past social history, past surgical history and problem list.     History reviewed. No pertinent past medical history. Past Surgical History:   Procedure Laterality Date   • FACIAL FRACTURE SURGERY     • LUMBAR DISCECTOMY         History reviewed. No pertinent family history. Medications have been verified. Objective   Pulse 77   Temp 97.9 °F (36.6 °C)   Resp 18   SpO2 97%   No LMP for male patient. Physical Exam     Physical Exam  Vitals and nursing note reviewed. Constitutional:       General: He is not in acute distress. Appearance: He is well-developed and normal weight. He is not ill-appearing, toxic-appearing or diaphoretic. HENT:      Head: Normocephalic and atraumatic. Right Ear: Tympanic membrane and ear canal normal. No drainage, swelling or tenderness. No middle ear effusion. Tympanic membrane is not erythematous. Left Ear: Tympanic membrane and ear canal normal. No drainage, swelling or tenderness. No middle ear effusion. Tympanic membrane is not erythematous. Nose: Rhinorrhea present. No congestion. Mouth/Throat:      Mouth: Mucous membranes are moist. No oral lesions. Pharynx: Oropharynx is clear. Uvula midline. Posterior oropharyngeal erythema present. No pharyngeal swelling, oropharyngeal exudate or uvula swelling. Tonsils: No tonsillar exudate or tonsillar abscesses. 0 on the right. 0 on the left.    Eyes:      Extraocular Movements:      Right eye: Normal extraocular motion. Left eye: Normal extraocular motion. Conjunctiva/sclera: Conjunctivae normal.      Pupils: Pupils are equal, round, and reactive to light. Neck:      Thyroid: No thyromegaly. Cardiovascular:      Rate and Rhythm: Normal rate and regular rhythm. Heart sounds: Normal heart sounds. No murmur heard. Pulmonary:      Effort: Pulmonary effort is normal.      Breath sounds: Normal breath sounds. Abdominal:      Palpations: Abdomen is soft. Tenderness: There is no abdominal tenderness. Musculoskeletal:      Cervical back: Normal range of motion and neck supple. Lymphadenopathy:      Cervical: No cervical adenopathy. Skin:     General: Skin is warm and dry. Capillary Refill: Capillary refill takes less than 2 seconds. Neurological:      General: No focal deficit present. Mental Status: He is alert and oriented to person, place, and time.    Psychiatric:         Mood and Affect: Mood normal.         Behavior: Behavior normal.

## 2023-09-20 NOTE — PROGRESS NOTES
There are no diagnoses linked to this encounter. No problem-specific Assessment & Plan notes found for this encounter. BERNARDINO Pedro is here today for evaluation of hemorrhoidal symptoms. The patient notes his last colonoscopy was around 5 years ago in [place] and as per his recollection 2 polyps were removed. 5 year recall. History reviewed. No pertinent past medical history. Past Surgical History:   Procedure Laterality Date   • FACIAL FRACTURE SURGERY     • LUMBAR DISCECTOMY         Current Outpatient Medications:   •  Cetirizine HCl (ZYRTEC PO), Take by mouth, Disp: , Rfl:   •  Dexmethylphenidate HCl (FOCALIN PO), Take by mouth (Patient not taking: Reported on 7/5/2023), Disp: , Rfl:   •  fluticasone (FLONASE) 50 mcg/act nasal spray, 1 spray into each nostril daily (Patient not taking: Reported on 7/5/2023), Disp: 9 mL, Rfl: 0  •  UNKNOWN TO PATIENT, , Disp: , Rfl:   Allergies as of 09/21/2023   • (No Known Allergies)     Review of Systems   Gastrointestinal: Positive for rectal pain. All other systems reviewed and are negative. There were no vitals filed for this visit. Physical Exam  Constitutional:       Appearance: Normal appearance. HENT:      Head: Normocephalic and atraumatic. Mouth/Throat:      Mouth: Mucous membranes are dry. Eyes:      Extraocular Movements: Extraocular movements intact. Pupils: Pupils are equal, round, and reactive to light. Musculoskeletal:         General: Normal range of motion. Skin:     General: Skin is warm and dry. Neurological:      General: No focal deficit present. Mental Status: He is alert and oriented to person, place, and time. Psychiatric:         Mood and Affect: Mood normal.         Behavior: Behavior normal.         Thought Content:  Thought content normal.         Judgment: Judgment normal.     Lower Endoscopy    Date/Time: 9/21/2023 11:40 AM    Performed by: John Paul Becerra MD  Authorized by: Lenin Hernandez Shannan Bravo MD    Verbal consent obtained?: Yes    Risks and benefits: Risks, benefits and alternatives were discussed    Consent given by:  Patient  Patient sedated: No    Scope type:   Anoscope  External exam performed: Yes    Pilonidal sinus tract: No    Pilonidal cyst: No    Pilonidal tenderness: No    Perianal skin tags: No    Perirectal warts: No    Perianal maceration: No    Perianal induration: No    Perianal erythema: No    External hemorrhoids: Yes    Digital exam performed: Yes    Laxity of anal sphincter: No    Internal hemorrhoids: Yes    Prolapsed: No    Inflammation: No    Anal fissures: No    Anal fistulae: No    Anal stricture: No    Abscess: No    Procedure termination:  Procedure complete  Patient tolerance:  Patient tolerated the procedure well with no immediate complications

## 2023-09-21 ENCOUNTER — OFFICE VISIT (OUTPATIENT)
Age: 58
End: 2023-09-21
Payer: COMMERCIAL

## 2023-09-21 VITALS — BODY MASS INDEX: 23.05 KG/M2 | WEIGHT: 161 LBS | HEIGHT: 70 IN

## 2023-09-21 DIAGNOSIS — K62.89 ANAL PAIN: Primary | ICD-10-CM

## 2023-09-21 PROCEDURE — 46600 DIAGNOSTIC ANOSCOPY SPX: CPT | Performed by: COLON & RECTAL SURGERY

## 2023-09-21 PROCEDURE — 99203 OFFICE O/P NEW LOW 30 MIN: CPT | Performed by: COLON & RECTAL SURGERY

## 2023-09-21 NOTE — ASSESSMENT & PLAN NOTE
Patient presents for evaluation of intermittent anorectal discomfort that is occurring more frequently. Patient notes he has intermittent lumps that formed around his anus. He also has burning and itching. He notes that this has been going on for years but seems to be worse over the past year. Examination shows some pruritic changes and a 3 small external thrombosis. Internal hemorrhoidal disease is minimal with small grade 2 internal hemorrhoids noted. In terms of his surgically correctable hemorrhoidal symptoms, he has no significant ongoing rectal bleeding or prolapse. He describes intermittent lumps around the anus. On examination today he does have a external thrombosed hemorrhoid. It is difficult to tell if this is similar to what he has at other points in times. Unfortunately excision of this will not lead to decrease the future episodes. Rubber band ligation may help with bleeding but he has this is a minimal part of his overall symptomatology. Topical Calmoseptine was offered for intermittent itching. If symptoms change or progress over time I have encouraged him to seek reevaluation.

## 2023-12-04 ENCOUNTER — NURSE TRIAGE (OUTPATIENT)
Age: 58
End: 2023-12-04

## 2023-12-04 ENCOUNTER — TELEPHONE (OUTPATIENT)
Age: 58
End: 2023-12-04

## 2023-12-04 NOTE — TELEPHONE ENCOUNTER
Pt. Having increased rectal pain due to external hemorrhoid, unsure if area is thrombosed but pt. Reporting "black spots" on hemorrhoid, pt. Using otc cortisone cream with minimal relief, no change in size of hemorrhoid, pt. Denies rectal bleeding, using ibuprofen for pain, advised to try a sitz bath for added relief and to continue to use ibuprofen, cortisone cream and ice as needed, pt. Scheduled in office on 12/7/23 for further evaluation and treatment, pt. Agreeable       Answer Questions - Hemorrhoids  When did your symptoms start:  last Thursday, pt. Has history of external hemorrhoids , rectal pain 8/10    Is there bright red blood when wiping or streaks in the stool: no    How many occurrences have you had in the last 24 hours: denies rupture    Have your symptoms: worsened    Do you feel this is a mild, moderate ,or severe amount of blood:  denies blood     Are you experiencing more diarrhea or constipation:  denies constipation or diarrhea     Do you have anything prescribed or over the counter for this: pt. Using ibuprofen and cortisone topical cream, applying cream to area a few times  with some relief but hemorrhoid is not shrinking   , pt. Also using ice packs to area, pt. Reports area of hemorrhoid has "black spots"     Are you experiencing any additional symptoms such as: pt.  Having a hard time sitting or working due to discomfort, temp taken during triage 98.1    Protocols used: Hemorrhoid

## 2023-12-04 NOTE — TELEPHONE ENCOUNTER
Patients GI provider:  Dr. Emelina Hughes    Number to return call: [phone]    Reason for call: Pt calling to advise that anal pain has worsened. Having difficulty sitting and doing many other things due to the pain. Pt is currently taking ibuprofen to try and help alleviate the symptoms. Transferred to [facility] in triage for further assistance.     Scheduled procedure/appointment date if applicable: N/A

## 2023-12-06 NOTE — PROGRESS NOTES
Diagnoses and all orders for this visit:    Anal pain       Anal pain  Patient presents for evaluation of anal pain. Patient notes starting approxi-1 week ago he noted anal lump. This caused significant anorectal discomfort. He notes over the past 1 to 2 days he has had some degree of rectal bleeding. This is improved his pain. Examination shows a partially drained right lateral thrombosed external hemorrhoid. We discussed the natural history and treatment of thrombosed external hemorrhoids. Patient has had similar episodes in the past.  We reviewed avoidance of straining with bowel regimen. Otherwise there is no specific recommendation. We discussed that surgical excision of his current thrombosed external hemorrhoid is not a preventative measure for future thrombosed external hemorrhoids. With this I recommended only expectant management. He will return as needed. BERNARDINO Joiner is here for evaluation of painful anal lump. Last seen in the office on 9/21/2023 for intermittent anal lumps, anorectal discomfort and itching. The patient notes his last colonoscopy was around 5 years ago in [place] and as per his recollection 2 polyps were removed. 5 year recall. History reviewed. No pertinent past medical history. Past Surgical History:   Procedure Laterality Date    FACIAL FRACTURE SURGERY      LUMBAR DISCECTOMY         Current Outpatient Medications:     Cetirizine HCl (ZYRTEC PO), Take by mouth, Disp: , Rfl:     Dexmethylphenidate HCl (FOCALIN PO), Take by mouth (Patient not taking: Reported on 7/5/2023), Disp: , Rfl:     fluticasone (FLONASE) 50 mcg/act nasal spray, 1 spray into each nostril daily (Patient not taking: Reported on 7/5/2023), Disp: 9 mL, Rfl: 0    UNKNOWN TO PATIENT, , Disp: , Rfl:   Allergies as of 12/07/2023    (No Known Allergies)     Review of Systems   Gastrointestinal:  Positive for anal bleeding and rectal pain.      Vitals:    12/07/23 0802   BP: 124/72 Physical Exam  Constitutional:       Appearance: Normal appearance. HENT:      Head: Normocephalic and atraumatic. Eyes:      Extraocular Movements: Extraocular movements intact. Pupils: Pupils are equal, round, and reactive to light. Genitourinary:     Comments: Right-sided partially drained thrombosed external hemorrhoid. Gentle enucleation performed  Skin:     General: Skin is warm and dry. Neurological:      Mental Status: He is alert. Psychiatric:         Mood and Affect: Mood normal.         Behavior: Behavior normal.         Thought Content:  Thought content normal.         Judgment: Judgment normal.

## 2023-12-07 ENCOUNTER — OFFICE VISIT (OUTPATIENT)
Age: 58
End: 2023-12-07
Payer: COMMERCIAL

## 2023-12-07 VITALS
BODY MASS INDEX: 24.34 KG/M2 | SYSTOLIC BLOOD PRESSURE: 124 MMHG | DIASTOLIC BLOOD PRESSURE: 72 MMHG | HEIGHT: 70 IN | WEIGHT: 170 LBS

## 2023-12-07 DIAGNOSIS — K62.89 ANAL PAIN: Primary | ICD-10-CM

## 2023-12-07 PROCEDURE — 99213 OFFICE O/P EST LOW 20 MIN: CPT | Performed by: COLON & RECTAL SURGERY

## 2023-12-07 NOTE — ASSESSMENT & PLAN NOTE
Patient presents for evaluation of anal pain. Patient notes starting approxi-1 week ago he noted anal lump. This caused significant anorectal discomfort. He notes over the past 1 to 2 days he has had some degree of rectal bleeding. This is improved his pain. Examination shows a partially drained right lateral thrombosed external hemorrhoid. We discussed the natural history and treatment of thrombosed external hemorrhoids. Patient has had similar episodes in the past.  We reviewed avoidance of straining with bowel regimen. Otherwise there is no specific recommendation. We discussed that surgical excision of his current thrombosed external hemorrhoid is not a preventative measure for future thrombosed external hemorrhoids. With this I recommended only expectant management. He will return as needed.

## 2024-12-10 ENCOUNTER — TELEPHONE (OUTPATIENT)
Age: 59
End: 2024-12-10

## 2024-12-10 NOTE — TELEPHONE ENCOUNTER
12/10/24  Screened by: Heather Cee    Referring Provider PCP    Pre- Screening:     There is no height or weight on file to calculate BMI. BMI- 23.7  Has patient been referred for a routine screening Colonoscopy? yes  Is the patient between 45-75 years old? yes      Previous Colonoscopy yes   If yes:    Date: 1/9/20219    Facility: [facility]    Reason: screening        Does the patient want to see a Gastroenterologist prior to their procedure OR are they having any GI symptoms? no    Has the patient been hospitalized or had abdominal surgery in the past 6 months? no    Does the patient use supplemental oxygen? no    Does the patient take Coumadin, Lovenox, Plavix, Elliquis, Xarelto, or other blood thinning medication? no    Has the patient had a stroke, cardiac event, or stent placed in the past year? no      If patient is between 45yrs - 49yrs, please advise patient that we will have to confirm benefits & coverage with their insurance company for a routine screening colonoscopy.

## 2024-12-10 NOTE — LETTER
Nick Cespedes,        Attached are your prep instructions for your upcoming colonoscopy scheduled on Thursday 1/9/2025 with Dr. Stuart.  On Wednesday 1/8/2025 the GI lab will be calling between 2pm and 6pm with your arrival time for your procedure.   If you have any questions, please feel free to contact our office at [phone].        Address to our location:    [facility]  [address] [address]       Thank you for choosing [facility]!                                                           COLONOSCOPY  MIRALAX/Dulcolax Bowel Preparation Instructions    The OR/GI Lab will contact you the evening prior to your procedure with your exact arrival time.    Our practice requires a 1 week notice for any cancellations or rescheduling. We kindly ask that you immediately notify us of any changes including any new medications that are prescribed. Thank you for your cooperation.     WEEK BEFORE YOUR PROCEDURE:  Stop taking Iron tablets.  5 days prior, AVOID vegetables and fruits with skins or seeds, nuts, corn, popcorn and whole grain breads.   Purchase: One (1) 238-gram container of Miralax (polyethylene glycol 3350), four (4) 5 mg Dulcolax (bisacodyl) tablets, and one (1) 64-ounce bottle of Gatorade (sports drink) - no red, orange, or purple. These may be purchased at any pharmacy without a prescription. Generic products are permissible.   Arrange responsible transportation for day of the procedure.     DAY BEFORE THE PROCEDURE:   CLEAR liquids only for entire day prior. Nothing red, orange or purple.    You MAY have:                                                               Soda  Water  Broth Gatorade  Jello  Popsicles Coffee/tea without milk/creamer     YOU MAY NOT HAVE:  Solid foods   Milk and milk products    Juice with pulp    BOWEL PREPARATION:  Includes: One (1) 238-gram container of Miralax (polyethylene  glycol 3350), four (4) 5 mg Dulcolax (bisacodyl) tablets, and one (1) 64-ounce bottle of Gatorade (sports drink).  Preparation may be refrigerated.  Entire bowel prep should be completed.     Afternoon before the procedure (2:00 pm - 5:00 pm):    Take two (2) 5 mg Dulcolax laxative tablets.     Evening before the procedure (6:00 pm):  Mix entire container of Miralax with one (1) 64-ounce bottle of Gatorade and shake until all medication is dissolved.   Begin drinking solution. Drink an eight (8) ounce cup every 10-15 minutes until you have consumed half (32 ounces) of the solution.  Refrigerate remaining solution.    Night before the procedure (8:00 pm):  Take two (2) 5 mg Dulcolax laxative tablets.     Beginning 5 hours before your procedure:  Drink the remaining amount of prepared solution (32 ounces).  Drink an eight (8) ounce cup every 10-15 minutes until you have consumed the remaining solution.     Bowel prep should be completed 4 hours prior to procedure time.    NOTHING TO EAT OR DRINK AFTER MIDNIGHT- EXCEPT FOR YOUR PREP    DAY OF THE PROCEDURE:  You may brush your teeth.  Leave all jewelry at home.  Please arrive for your procedure as indicated by the OR / GI Lab / Endoscopy Unit. The hospital will contact you the day before with your exact arrival time.   Make sure you have arranged ahead of time for a responsible adult (18 or older) to accompany and drive you home after the procedure.  Please discuss any transportation concerns with our staff prior to your procedure.    The effects of the anesthesia can persist for 24 hours.  After receiving the sedation, you must exercise caution before engaging in any activity that could harm yourself and others (such as driving a car).  Do not make any important decisions or do not drink any alcoholic beverages during this time period.  After your procedure, you may have anything you'd like to eat or drink.  You will probably want to start with something light.   Please include plenty of fluids.  Avoid items that cause gas such as sodas and salads.    SPECIAL INSTRUCTIONS:    For patients currently taking blood thinners and/or antiplatelet therapy our office will contact the prescribing provider.  Our office will contact you with any required changes to your medication regimen.     Blood thinner (i.e. - Coumadin, Pradaxa, Lovenox, Xarelto, Eliquis)  ?  Continue (Do Not Stop)  ? Stop______________for_____________days prior to the procedure.    Antiplatelet (i.e. - Plavix, Aggrenox, Effient, Brilinta)  ?  Continue (Do Not Stop)  ? Stop______________for_____________days prior to the procedure.       Diabetes:   If you are Diabetic, please see separate Diabetic Instruction Sheet.          Prescribed medications:  Do not stop your aspirin, or any of your other medications (unless instructed otherwise).    Take the rest of your prescribed medications with small sips of water at least 2 hours prior to your procedure.                          Medicine Instructions for Adults with Diabetes who Need a Bowel Prep       Follow these instructions when a BOWEL PREP is required for your procedure or surgery!    NOTE:   GLP-1 Agonists taken weekly: do not take in the 7 days before your procedure   SGLT-2 Inhibitors: do not take in the 4 days before your procedure     On the Day Before Surgery/Procedure  If you are having a procedure (e.g. Colonoscopy) or surgery that requires a bowel prep and you may have at least a clear liquid diet, follow the directions below based on the type of medicine you take for your diabetes.     Type of Medicine You Take Examples What to do   Pre-Mixed Insulin - Intermediate Acting Humalog® 75/25, Humulin® 70/30, Novolog® 70/30, Regular Insulin Take ½ your regular dose the evening before your procedure   Rapid/Fast Acting Insulin Humalog® U200, NovoLog®, Apidra®, Fiasp® Take ½ your regular dose the evening before your procedure.   Long-Acting Insulin Lantus®,  Levemir®, Tresiba®, Toujeo®, Basaglar® Take your FULL regular dose the day before procedure   Oral Sulfonylurea Glipizide/Glimepiride/Glucotrol® Take ½ your regular dose the evening before your procedure   Other Oral Diabetes Medicines Metformin®, Glucophage®, Glucophage XR®, Riomet®, Glumetza®), Actose®, Avandia®, Glyset®, Prandin® Take your regular dose with dinner in the evening before your procedure   GLP-1 Agonists AdlyxinÒ, ByettaÒ, BydureonÒ, OzempicÒ, SoliquaÒ, TanzeumÒ, TrulicityÒ, VictozaÒ, Saxenda®, Rybelsus® If taken daily, take as normal    If taken weekly, do not take this medicine for 7 days before your procedure including the day of the procedure (resume taking after the procedure)   SGLT-2 Inhibitors Jardiance®, Invokana®, Farxiga®,   Steglatro®, Brenzavvy®, Qtern®, Segluromet®, Glyxambi®, Synjardy®, Synjardy XR®, Invokamet®, Invokamet XR®, Trijary XR®, Xigduo XR®, Steglujan® Do not take for 4 days before your procedure including the day of the procedure (resume taking after the procedure)                More information continued on back                                      Medicine Instructions for Adults with Diabetes who Need a Bowel Prep  Page 2      On the Day of Surgery/Procedure  Follow the directions below based on the type of medicine you take for your diabetes.     Type of Medicine You Take Examples What to do   Long-Acting Insulin Lantus®, Levemir®, Tresiba®, Toujeo®, Basaglar®, Semglee®   If you usually take your Long-Acting Insulin in the morning, take the full dose as scheduled.   GLP-1 Agonists AdlyxinÒ, ByettaÒ, BydureonÒ, OzempicÒ, SoliquaÒ, TanzeumÒ, TrulicityÒ, VictozaÒ, Saxenda®, Rybelsus® Do NOT take this medicine on the day of your procedure (resume taking after the procedure)       On the Day of Surgery/Procedure (continued)  Except for the morning Long-Acting Insulin, DO NOT take ANY diabetic medicine on the day of your procedure unless you were instructed by the doctor who  manages your diabetes medicines.    Continue to check your blood sugars.  If you have an insulin pump, ask your endocrinologist for instructions at least 3 days before your procedure. NOTE: If you are not able to ask your endocrinologist in advance, on the day of the procedure set your insulin pump to your basal rate only. Bring your insulin pump supplies to the hospital.     If you have any questions about taking your diabetes medicines prior to your procedure, please contact the doctor who manages your diabetes medicines.                                                          For any questions or concerns related to your bowel preparation or pre-procedure instructions, please contact our office at [phone].  Thank you for choosing [facility]!

## 2024-12-10 NOTE — TELEPHONE ENCOUNTER
Scheduled date of colonoscopy (as of today): Thursday 1/9/2025  Physician performing colonoscopy: Dr. Stuart   Location of colonoscopy: [facility]  Bowel prep reviewed with patient: Miralax / Dulcolax  Instructions reviewed with patient by: Ricco COOPER - Sent via [employer]  Clearances: N/A

## 2025-01-08 RX ORDER — LIDOCAINE HYDROCHLORIDE 10 MG/ML
0.5 INJECTION, SOLUTION EPIDURAL; INFILTRATION; INTRACAUDAL; PERINEURAL ONCE AS NEEDED
Status: CANCELLED | OUTPATIENT
Start: 2025-01-08

## 2025-01-08 RX ORDER — SODIUM CHLORIDE, SODIUM LACTATE, POTASSIUM CHLORIDE, CALCIUM CHLORIDE 600; 310; 30; 20 MG/100ML; MG/100ML; MG/100ML; MG/100ML
125 INJECTION, SOLUTION INTRAVENOUS CONTINUOUS
Status: CANCELLED | OUTPATIENT
Start: 2025-01-08

## 2025-01-09 ENCOUNTER — ANESTHESIA (OUTPATIENT)
Dept: GASTROENTEROLOGY | Facility: HOSPITAL | Age: 60
End: 2025-01-09
Payer: COMMERCIAL

## 2025-01-09 ENCOUNTER — HOSPITAL ENCOUNTER (OUTPATIENT)
Dept: GASTROENTEROLOGY | Facility: HOSPITAL | Age: 60
Setting detail: OUTPATIENT SURGERY
End: 2025-01-09
Attending: INTERNAL MEDICINE
Payer: COMMERCIAL

## 2025-01-09 ENCOUNTER — ANESTHESIA EVENT (OUTPATIENT)
Dept: GASTROENTEROLOGY | Facility: HOSPITAL | Age: 60
End: 2025-01-09
Payer: COMMERCIAL

## 2025-01-09 VITALS
RESPIRATION RATE: 20 BRPM | HEART RATE: 90 BPM | DIASTOLIC BLOOD PRESSURE: 52 MMHG | SYSTOLIC BLOOD PRESSURE: 103 MMHG | TEMPERATURE: 97.6 F | OXYGEN SATURATION: 99 %

## 2025-01-09 DIAGNOSIS — Z12.11 SCREENING FOR COLON CANCER: ICD-10-CM

## 2025-01-09 PROCEDURE — 45385 COLONOSCOPY W/LESION REMOVAL: CPT | Performed by: STUDENT IN AN ORGANIZED HEALTH CARE EDUCATION/TRAINING PROGRAM

## 2025-01-09 PROCEDURE — 88305 TISSUE EXAM BY PATHOLOGIST: CPT | Performed by: PATHOLOGY

## 2025-01-09 RX ORDER — LIDOCAINE HYDROCHLORIDE 20 MG/ML
INJECTION, SOLUTION EPIDURAL; INFILTRATION; INTRACAUDAL; PERINEURAL AS NEEDED
Status: DISCONTINUED | OUTPATIENT
Start: 2025-01-09 | End: 2025-01-09

## 2025-01-09 RX ORDER — LIDOCAINE HYDROCHLORIDE 10 MG/ML
0.5 INJECTION, SOLUTION EPIDURAL; INFILTRATION; INTRACAUDAL; PERINEURAL ONCE AS NEEDED
Status: DISCONTINUED | OUTPATIENT
Start: 2025-01-09 | End: 2025-01-13 | Stop reason: HOSPADM

## 2025-01-09 RX ORDER — SODIUM CHLORIDE, SODIUM LACTATE, POTASSIUM CHLORIDE, CALCIUM CHLORIDE 600; 310; 30; 20 MG/100ML; MG/100ML; MG/100ML; MG/100ML
125 INJECTION, SOLUTION INTRAVENOUS CONTINUOUS
Status: DISCONTINUED | OUTPATIENT
Start: 2025-01-09 | End: 2025-01-13 | Stop reason: HOSPADM

## 2025-01-09 RX ORDER — PROPOFOL 10 MG/ML
INJECTION, EMULSION INTRAVENOUS AS NEEDED
Status: DISCONTINUED | OUTPATIENT
Start: 2025-01-09 | End: 2025-01-09

## 2025-01-09 RX ORDER — SODIUM CHLORIDE, SODIUM LACTATE, POTASSIUM CHLORIDE, CALCIUM CHLORIDE 600; 310; 30; 20 MG/100ML; MG/100ML; MG/100ML; MG/100ML
INJECTION, SOLUTION INTRAVENOUS CONTINUOUS PRN
Status: DISCONTINUED | OUTPATIENT
Start: 2025-01-09 | End: 2025-01-09

## 2025-01-09 RX ADMIN — SODIUM CHLORIDE, SODIUM LACTATE, POTASSIUM CHLORIDE, AND CALCIUM CHLORIDE 125 ML/HR: .6; .31; .03; .02 INJECTION, SOLUTION INTRAVENOUS at 09:36

## 2025-01-09 RX ADMIN — PROPOFOL 100 MG: 10 INJECTION, EMULSION INTRAVENOUS at 09:48

## 2025-01-09 RX ADMIN — PROPOFOL 20 MG: 10 INJECTION, EMULSION INTRAVENOUS at 10:05

## 2025-01-09 RX ADMIN — SODIUM CHLORIDE, SODIUM LACTATE, POTASSIUM CHLORIDE, AND CALCIUM CHLORIDE: .6; .31; .03; .02 INJECTION, SOLUTION INTRAVENOUS at 09:44

## 2025-01-09 RX ADMIN — LIDOCAINE HYDROCHLORIDE 60 MG: 20 INJECTION, SOLUTION EPIDURAL; INFILTRATION; INTRACAUDAL; PERINEURAL at 09:48

## 2025-01-09 RX ADMIN — PROPOFOL 130 MCG/KG/MIN: 10 INJECTION, EMULSION INTRAVENOUS at 09:49

## 2025-01-09 RX ADMIN — PROPOFOL 30 MG: 10 INJECTION, EMULSION INTRAVENOUS at 09:52

## 2025-01-09 NOTE — ANESTHESIA POSTPROCEDURE EVALUATION
Post-Op Assessment Note    CV Status:  Stable  Pain Score: 0    Pain management: adequate       Mental Status:  Sleepy   Hydration Status:  Stable   PONV Controlled:  None   Airway Patency:  Patent     Post Op Vitals Reviewed: Yes    No anethesia notable event occurred.    Staff: CRNA           Last Filed PACU Vitals:  Vitals Value Taken Time   Temp 98.5    Pulse 94    BP 98/54    Resp 16    SpO2 98%

## 2025-01-09 NOTE — H&P
[facility]  History & Physical     PATIENT INFO     Name: Coy Montana  YOB: 1965   Age: 59 y.o.   Sex: male   MRN: [id number]     HISTORY OF PRESENT ILLNESS     Coy Montana is a 59 y.o. year old male who presents for screening colonoscopy.  No antithrombotics or anticoagulants.     REVIEW OF SYSTEMS     Per the HPI, and otherwise unremarkable.    Historical Information   Past Medical History:   Diagnosis Date    Colon polyp      Past Surgical History:   Procedure Laterality Date    FACIAL FRACTURE SURGERY      LUMBAR DISCECTOMY       Social History   Social History     Substance and Sexual Activity   Alcohol Use Yes     Social History     Substance and Sexual Activity   Drug Use Not Currently     Social History     Tobacco Use   Smoking Status Former   Smokeless Tobacco Never     History reviewed. No pertinent family history.     MEDICATIONS & ALLERGIES     Current Outpatient Medications   Medication Instructions    Cetirizine HCl (ZYRTEC PO) Take by mouth    Dexmethylphenidate HCl (FOCALIN PO) Take by mouth    fluticasone (FLONASE) 50 mcg/act nasal spray 1 spray, Nasal, Daily    UNKNOWN TO PATIENT No dose, route, or frequency recorded.     No Known Allergies     PHYSICAL EXAM      Objective   Blood pressure 124/73, pulse 93, temperature 97.6 °F (36.4 °C), temperature source Temporal, resp. rate 18, SpO2 96%. There is no height or weight on file to calculate BMI.    General Appearance:   Alert, cooperative, no distress   Lungs:   Equal chest rise, respirations unlabored    Heart:   Regular rate and rhythm   Abdomen:   Soft, non-tender, non-distended; normal bowel sounds; no masses, no organomegaly    Extremities:   No edema       ASSESSMENT & PLAN     This is a 59 y.o. year old male here for colonoscopy, and he is stable and optimized for his procedure.      Ashutosh Stuart D.O.  [facility]  Division of Gastroenterology & Hepatology  Available on  [email or web address]  [email or web address]    ** Please Note: This note is constructed using a voice recognition dictation system. **

## 2025-01-09 NOTE — ANESTHESIA PREPROCEDURE EVALUATION
Procedure:  COLONOSCOPY    Relevant Problems   Neurology/Sleep   (+) Anal pain        Physical Exam    Airway    Mallampati score: II  TM Distance: >3 FB  Neck ROM: full     Dental   No notable dental hx     Cardiovascular  Rhythm: regular, Rate: normal, Cardiovascular exam normal    Pulmonary  Pulmonary exam normal Breath sounds clear to auscultation    Other Findings        Anesthesia Plan  ASA Score- 1     Anesthesia Type- IV sedation with anesthesia with ASA Monitors.         Additional Monitors:     Airway Plan:     Comment: Discussed risks/benefits, including medication reactions, awareness, aspiration, and serious/life threatening complications. Plan to maintain native airway with IVGA, monitored with EtCO2    Discussed the increase risk of undergoing anesthesia with recent/ongoing URI. Considering procedural timing, the patient wishes to proceed..       Plan Factors-Exercise tolerance (METS): >4 METS.    Chart reviewed.    Patient summary reviewed.      Patient instructed to abstain from smoking on day of procedure. Patient did not smoke on day of surgery.            Induction- intravenous.    Postoperative Plan-         Informed Consent- Anesthetic plan and risks discussed with patient.  I personally reviewed this patient with the CRNA. Discussed and agreed on the Anesthesia Plan with the CRNA..

## 2025-01-14 ENCOUNTER — RESULTS FOLLOW-UP (OUTPATIENT)
Dept: GASTROENTEROLOGY | Facility: CLINIC | Age: 60
End: 2025-01-14

## 2025-01-14 PROCEDURE — 88305 TISSUE EXAM BY PATHOLOGIST: CPT | Performed by: PATHOLOGY

## 2025-01-15 ENCOUNTER — OFFICE VISIT (OUTPATIENT)
Dept: CARDIOLOGY CLINIC | Facility: CLINIC | Age: 60
End: 2025-01-15
Payer: COMMERCIAL

## 2025-01-15 VITALS
DIASTOLIC BLOOD PRESSURE: 80 MMHG | WEIGHT: 171 LBS | BODY MASS INDEX: 24.48 KG/M2 | HEIGHT: 70 IN | HEART RATE: 93 BPM | SYSTOLIC BLOOD PRESSURE: 148 MMHG

## 2025-01-15 DIAGNOSIS — R07.89 OTHER CHEST PAIN: Primary | ICD-10-CM

## 2025-01-15 DIAGNOSIS — I65.23 CAROTID ATHEROSCLEROSIS, BILATERAL: ICD-10-CM

## 2025-01-15 PROCEDURE — 93000 ELECTROCARDIOGRAM COMPLETE: CPT | Performed by: INTERNAL MEDICINE

## 2025-01-15 PROCEDURE — 99204 OFFICE O/P NEW MOD 45 MIN: CPT | Performed by: INTERNAL MEDICINE

## 2025-01-15 NOTE — PROGRESS NOTES
[facility] [place] [address] [facility] [address] [phone]                                              Cardiology Office Consult  Coy [place], 59 y.o. male  YOB: 1965  MRN: [id number] Encounter: [id number]      PCP - Fercho Adams MD  Referring Provider - Self, Referral    Chief Complaint   Patient presents with   • Chest Pain       Assessment  Chest discomfort  Carotid atherosclerosis    Prior cardiac / relevant testing (reviewed on patient's phone today)  Stress test - 5/23/16 - 13:02 min, 99%MPHR , negatie stres ecg, no significant arrhythmia  TTE - 6/2016 - LVEF 60-70%, mild MR/TR, mild thickening of MV  Carotid duplex 3/24/2016 ([facility]) - <50% B/L stenosis, minimal b/l intimal plaque    Plan  Assessment & Plan  Other chest pain  Overview  Atypical symptoms, no clear triggers or worsening features  Mostly appears to be brief lasting about 5 minutes and occurring even at rest - ?  Mostly when getting on or off planes  ECG today -normal sinus rhythm, no acute ST-T changes  Impression  Unclear etiology, ?stress v CAD v GI  Plan  Check exercise stress echocardiogram to rule out ischemia and structural heart abnormalities  Monitor for symptoms and try and identify triggering or relieving factors  If any exertional chest pain or worsening of symptoms and let me know  Optimize cardiac risk factors    Carotid atherosclerosis, bilateral   4/24/24 ([facility])   Cholesterol 198   Triglycerides 78   HDL 63      VLDL 14   Prior carotid scan without 8 years ago had shown mild bilaterally carotid atherosclerosis, but there has been no interval follow-up testing  LDL was elevated at 121 last year, and cholesterol levels borderline  No recent lipid panel available this year  Check carotid duplex  Check lipid panel prior to next office visit          Results for orders placed or performed in visit on 01/15/25   POCT ECG    Impression     Normal sinus rhythm  Normal ECG       Orders Placed This Encounter   Procedures   • Lipid Panel with Direct LDL reflex   • POCT ECG     Return in about 2 months (around 3/15/2025), or if symptoms worsen or fail to improve.      History of Present Illness   59 y.o. male , who runs a [employer], comes in as a new patient for consultation regarding ongoing symptoms of chest discomfort.    Chest pain - ache/tightness, central, mostly seems when to occur while getting on & off planes, about once in 2-4 weeks, resolves within a few minutes.  Recently it occurred such that the symptoms  To the back and radiated into the left arm as well which got more concerned.    Mountain bike - 20 miles in 2 hours without problems.  Walks a lot    Alcohol occasional - rarely may have 3-5 drinks.    Family history  Father -  at 75, heart disease s/p coronary stents (in 60s), carotid issues, alcohol abuse/heavy meat use  Mother - alive at 78, no known CAD or heart problems  Siblings: Oldest of 3 siblings  No known heart problems    Historical Information   Past Medical History:   Diagnosis Date   • Colon polyp      Past Surgical History:   Procedure Laterality Date   • FACIAL FRACTURE SURGERY     • LUMBAR DISCECTOMY       Family History   Problem Relation Age of Onset   • Heart disease Father      Current Outpatient Medications   Medication Instructions   • Cetirizine HCl (ZYRTEC PO) Take by mouth   • Dexmethylphenidate HCl (FOCALIN PO) 10 mg, Oral, As needed      No Known Allergies  Social History     Socioeconomic History   • Marital status: Single     Spouse name: None   • Number of children: None   • Years of education: None   • Highest education level: None   Occupational History   • None   Tobacco Use   • Smoking status: Former   • Smokeless tobacco: Never   Vaping Use   • Vaping status: Never Used   Substance and Sexual Activity   • Alcohol use: Yes   • Drug use: Not Currently   • Sexual activity: None   Other Topics Concern   •  "None   Social History Narrative   • None     Social Drivers of Health     Financial Resource Strain: Not on file   Food Insecurity: Not on file   Transportation Needs: Not on file   Physical Activity: Not on file   Stress: Not on file   Social Connections: Not on file   Intimate Partner Violence: Not on file   Housing Stability: Not on file        Review of Systems   All other systems reviewed and are negative.        Vitals:  Vitals:    01/15/25 1500   BP: 148/80   Pulse: 93   Weight: 77.6 kg (171 lb)   Height: 5' 10\" (1.778 m)     BMI - Body mass index is 24.54 kg/m².  Wt Readings from Last 7 Encounters:   01/15/25 77.6 kg (171 lb)   12/07/23 77.1 kg (170 lb)   09/21/23 73 kg (161 lb)   10/07/20 76.2 kg (168 lb)   08/10/20 71.2 kg (157 lb)   07/22/20 72.6 kg (160 lb)   07/07/20 74.4 kg (164 lb)       Physical Exam  Vitals and nursing note reviewed.   Constitutional:       General: He is not in acute distress.     Appearance: He is well-developed. He is not ill-appearing or diaphoretic.   HENT:      Head: Normocephalic and atraumatic.      Nose: No congestion.   Eyes:      General: No scleral icterus.     Conjunctiva/sclera: Conjunctivae normal.   Neck:      Vascular: No carotid bruit or JVD.   Cardiovascular:      Rate and Rhythm: Normal rate and regular rhythm.      Heart sounds: Normal heart sounds. No murmur heard.     No friction rub. No gallop.   Pulmonary:      Effort: Pulmonary effort is normal. No respiratory distress.      Breath sounds: Normal breath sounds. No wheezing or rales.   Chest:      Chest wall: No tenderness.   Abdominal:      General: There is no distension.      Palpations: Abdomen is soft.      Tenderness: There is no abdominal tenderness.   Musculoskeletal:         General: No swelling, tenderness or deformity.      Cervical back: Neck supple. No muscular tenderness.      Right lower leg: No edema.      Left lower leg: No edema.   Skin:     General: Skin is warm.   Neurological:      " "General: No focal deficit present.      Mental Status: He is alert and oriented to person, place, and time. Mental status is at baseline.   Psychiatric:         Mood and Affect: Mood normal.         Behavior: Behavior normal.         Thought Content: Thought content normal.           Labs:  CBC: No results found for: \"WBC\", \"RBC\", \"HGB\", \"HCT\", \"MCV\", \"PLT\", \"RDW\"    CMP: No results found for: \"NA\", \"K\", \"CL\", \"CO2\", \"ANIONGAP\", \"BUN\", \"CREATININE\", \"EGFR\", \"GLUCOSE\", \"CALCIUM\", \"AST\", \"ALT\", \"ALKPHOS\", \"PROT\", \"BILITOT\"    Magnesium:  No results found for: \"MG\"    Lipid Profile:   No results found for: \"CHOL\", \"HDL\", \"TRIG\", \"LDLCALC\"    Thyroid Studies: No results found for: \"HZH0MAOTPFQJ\", \"T3FREE\", \"FREET4\", \"E3YYOZY\", \"W0YGQXQ\"    A1c:  No components found for: \"HGA1C\"    INR:  No results found for: \"INR\"5    Cardiac testing:   No results found for this or any previous visit.    No results found for this or any previous visit.    No results found for this or any previous visit.    No results found for this or any previous visit.      Colonoscopy  Addendum: [facility]   [address] [place] [address] [phone]     DATE OF SERVICE:   1/09/25     PHYSICIAN(S):   Attending:    Ashutosh Stuart DO      Fellow:    No Staff Documented      INDICATION:   Screening for colon cancer     POST-OP DIAGNOSIS:   See the impression below.     HISTORY:   Prior colonoscopy: 6 years ago.     BOWEL PREPARATION:   Miralax/Dulcolax     PREPROCEDURE:   Informed consent was obtained for the procedure, including sedation. Risks  including but not limited to bleeding, infection, perforation, adverse  drug reaction and aspiration were explained in detail. Also explained  about less than 100% sensitivity with the exam and other alternatives. The  patient was placed in the left lateral decubitus position.     Procedure: Colonoscopy      DETAILS OF PROCEDURE:    Patient was taken to the procedure room " where a time out was performed to  confirm correct patient and correct procedure. The patient underwent  monitored anesthesia care, which was administered by an anesthesia  professional. The patient's blood pressure, ECG, ETCO2, heart rate, level  of consciousness, oxygen and respirations were monitored throughout the  procedure. A digital rectal exam was performed. A perianal exam was  performed. The scope was introduced through the anus and advanced to the  cecum. Retroflexion was performed in the rectum. The quality of bowel  preparation was evaluated using the [place] Bowel Preparation Scale with  scores of: right colon = 2, transverse colon = 2, left colon = 2. The  total BBPS score was 6. Bowel prep was adequate. The patient experienced  no blood loss. The procedure was not difficult. The patient tolerated the  procedure well. There were no apparent adverse events.      ANESTHESIA INFORMATION:   ASA: I   Anesthesia Type: IV Sedation with Anesthesia     MEDICATIONS:   lactated ringers infusion Not documented*     *Total volume has not been documented. View each administration to see  the amount administered.    (Totals for administrations occurring from 0934 to 1013 on 01/09/25)      FINDINGS:   One flat and benign-appearing Luma IIa polyp measuring smaller than 5 mm  in the descending colon; performed cold snare with complete en bloc  removal and retrieved specimen   The ileocecal valve, appendiceal orifice, cecum, ascending colon, hepatic  flexure, transverse colon, splenic flexure, sigmoid colon, rectosigmoid  and rectum appeared normal.     EVENTS:   Procedure Events    Event Event Time    ENDO CECUM REACHED 1/9/2025  9:57 AM    ENDO SCOPE OUT TIME 1/9/2025 10:10 AM      SPECIMENS:   ID Type Source Tests Collected by Time Destination    1 : cold snare of polyp x1 Tissue Large Intestine, Left/Descending Colon  TISSUE EXAM Lacey German MD 1/9/2025 10:05 AM       EQUIPMENT:   Colonoscope -    ENDOCUFF  VISION LRG GREEN ID [id number]     IMPRESSION:   One Luma IIa polyp measuring smaller than 5 mm in the descending colon;  performed cold snare removal   The ileocecal valve, appendiceal orifice, cecum, ascending colon, hepatic  flexure, transverse colon, splenic flexure, sigmoid colon, rectosigmoid  and rectum appeared normal.     RECOMMENDATION:   Await pathology results    Repeat screening colonoscopy in 10 years, due: 1/7/2035    If pathology results show benign polyp, may extend screening interval to  10 years     Addendum: Recall updated to 10 years based on pathology results                   Ashutosh Stuart DO  Narrative: Table formatting from the original result was not included.  [facility]  [address] [place] [address] [phone]    DATE OF SERVICE:  1/09/25    PHYSICIAN(S):  Attending:   Ashutosh Stuart DO     Fellow:   No Staff Documented     INDICATION:  Screening for colon cancer    POST-OP DIAGNOSIS:  See the impression below.    HISTORY:  Prior colonoscopy: 6 years ago.    BOWEL PREPARATION:  Miralax/Dulcolax    PREPROCEDURE:  Informed consent was obtained for the procedure, including sedation. Risks   including but not limited to bleeding, infection, perforation, adverse   drug reaction and aspiration were explained in detail. Also explained   about less than 100% sensitivity with the exam and other alternatives. The   patient was placed in the left lateral decubitus position.    Procedure: Colonoscopy     DETAILS OF PROCEDURE:   Patient was taken to the procedure room where a time out was performed to   confirm correct patient and correct procedure. The patient underwent   monitored anesthesia care, which was administered by an anesthesia   professional. The patient's blood pressure, ECG, ETCO2, heart rate, level   of consciousness, oxygen and respirations were monitored throughout the   procedure. A digital rectal exam was performed. A perianal exam was   performed. The  scope was introduced through the anus and advanced to the   cecum. Retroflexion was performed in the rectum. The quality of bowel   preparation was evaluated using the [place] Bowel Preparation Scale with   scores of: right colon = 2, transverse colon = 2, left colon = 2. The   total BBPS score was 6. Bowel prep was adequate. The patient experienced   no blood loss. The procedure was not difficult. The patient tolerated the   procedure well. There were no apparent adverse events.     ANESTHESIA INFORMATION:  ASA: I  Anesthesia Type: IV Sedation with Anesthesia    MEDICATIONS:  lactated ringers infusion Not documented*    *Total volume has not been documented. View each administration to see   the amount administered.   (Totals for administrations occurring from 0934 to 1013 on 01/09/25)     FINDINGS:  One flat and benign-appearing Luma IIa polyp measuring smaller than 5 mm   in the descending colon; performed cold snare with complete en bloc   removal and retrieved specimen  The ileocecal valve, appendiceal orifice, cecum, ascending colon, hepatic   flexure, transverse colon, splenic flexure, sigmoid colon, rectosigmoid   and rectum appeared normal.    EVENTS:  Procedure Events   Event Event Time   ENDO CECUM REACHED 1/9/2025  9:57 AM   ENDO SCOPE OUT TIME 1/9/2025 10:10 AM     SPECIMENS:  ID Type Source Tests Collected by Time Destination   1 : cold snare of polyp x1 Tissue Large Intestine, Left/Descending Colon   TISSUE EXAM Lacey German MD 1/9/2025 10:05 AM      EQUIPMENT:  Colonoscope -   ENDOCUFF VISION LRG GREEN ID [id number]  Impression: One Luma IIa polyp measuring smaller than 5 mm in the descending colon;   performed cold snare removal  The ileocecal valve, appendiceal orifice, cecum, ascending colon, hepatic   flexure, transverse colon, splenic flexure, sigmoid colon, rectosigmoid   and rectum appeared normal.    RECOMMENDATION:  Await pathology results   Repeat colonoscopy in 7 years, due:  1/8/2032  Personal history of colon polyps     If pathology results show benign polyp, may extend screening interval to   10 years               Ashutosh Stuart DO

## 2025-01-21 ENCOUNTER — TELEPHONE (OUTPATIENT)
Dept: CARDIOLOGY CLINIC | Facility: CLINIC | Age: 60
End: 2025-01-21

## 2025-01-21 DIAGNOSIS — I65.23 CAROTID ATHEROSCLEROSIS, BILATERAL: ICD-10-CM

## 2025-01-21 DIAGNOSIS — I34.0 NONRHEUMATIC MITRAL VALVE REGURGITATION: ICD-10-CM

## 2025-01-21 DIAGNOSIS — R07.89 OTHER CHEST PAIN: Primary | ICD-10-CM

## 2025-01-21 NOTE — TELEPHONE ENCOUNTER
Coy was called because his stress test was denied by insurance. It said a peer to peer wasn't done.    Do you want me to put in a plain stress test and plain echo to see if that works as it has in the past? He also canceled his carotid because he wants to try to get the testing back to back again due to work.     Please advise

## 2025-01-22 NOTE — TELEPHONE ENCOUNTER
Received a call from Coy went over the message from provider, connected with central scheduling to get testing set up.     Pt verbally understood

## 2025-01-22 NOTE — TELEPHONE ENCOUNTER
Orders were done already.  I called renuka and left him a message to call central scheduling and get scheduled.

## 2025-02-03 ENCOUNTER — HOSPITAL ENCOUNTER (OUTPATIENT)
Dept: NON INVASIVE DIAGNOSTICS | Facility: HOSPITAL | Age: 60
Discharge: HOME | End: 2025-02-03
Payer: COMMERCIAL

## 2025-02-03 ENCOUNTER — RESULTS FOLLOW-UP (OUTPATIENT)
Dept: CARDIOLOGY CLINIC | Facility: CLINIC | Age: 60
End: 2025-02-03

## 2025-02-03 ENCOUNTER — HOSPITAL ENCOUNTER (OUTPATIENT)
Dept: NON INVASIVE DIAGNOSTICS | Facility: HOSPITAL | Age: 60
Discharge: HOME | End: 2025-02-03
Attending: INTERNAL MEDICINE
Payer: COMMERCIAL

## 2025-02-03 VITALS
HEART RATE: 81 BPM | SYSTOLIC BLOOD PRESSURE: 148 MMHG | WEIGHT: 171 LBS | DIASTOLIC BLOOD PRESSURE: 80 MMHG | HEIGHT: 70 IN | BODY MASS INDEX: 24.48 KG/M2

## 2025-02-03 VITALS
HEART RATE: 78 BPM | HEIGHT: 70 IN | SYSTOLIC BLOOD PRESSURE: 124 MMHG | BODY MASS INDEX: 24.48 KG/M2 | DIASTOLIC BLOOD PRESSURE: 70 MMHG | WEIGHT: 171 LBS | OXYGEN SATURATION: 98 % | RESPIRATION RATE: 20 BRPM

## 2025-02-03 DIAGNOSIS — R07.89 OTHER CHEST PAIN: ICD-10-CM

## 2025-02-03 DIAGNOSIS — I65.23 CAROTID ATHEROSCLEROSIS, BILATERAL: ICD-10-CM

## 2025-02-03 DIAGNOSIS — I34.0 NONRHEUMATIC MITRAL VALVE REGURGITATION: ICD-10-CM

## 2025-02-03 LAB
AORTIC ROOT: 2.9 CM
AORTIC VALVE MEAN VELOCITY: 8.6 M/S
ASCENDING AORTA: 2.5 CM
AV AREA BY CONTINUOUS VTI: 3 CM2
AV AREA PEAK VELOCITY: 2.9 CM2
AV LVOT MEAN GRADIENT: 2 MMHG
AV LVOT PEAK GRADIENT: 4 MMHG
AV MEAN PRESS GRAD SYS DOP V1V2: 3 MMHG
AV ORIFICE AREA US: 2.99 CM2
AV PEAK GRADIENT: 5 MMHG
AV VELOCITY RATIO: 0.95
AV VMAX SYS DOP: 1.09 M/S
BSA FOR ECHO PROCEDURE: 1.95 M2
DOP CALC AO VTI: 20.52 CM
DOP CALC LVOT AREA: 3.14 CM2
DOP CALC LVOT CARDIAC INDEX: 2.73 L/MIN/M2
DOP CALC LVOT CARDIAC OUTPUT: 5.33 L/MIN
DOP CALC LVOT DIAMETER: 2 CM
DOP CALC LVOT PEAK VEL VTI: 19.55 CM
DOP CALC LVOT PEAK VEL: 1.01 M/S
DOP CALC LVOT STROKE INDEX: 32.8 ML/M2
DOP CALC LVOT STROKE VOLUME: 61.39
E WAVE DECELERATION TIME: 249 MS
E/A RATIO: 1
FRACTIONAL SHORTENING: 31 (ref 28–44)
INTERVENTRICULAR SEPTUM IN DIASTOLE (PARASTERNAL SHORT AXIS VIEW): 0.9 CM
INTERVENTRICULAR SEPTUM: 0.9 CM (ref 0.6–1.1)
LAAS-AP2: 14 CM2
LAAS-AP4: 9.5 CM2
LEFT ATRIUM SIZE: 3.5 CM
LEFT ATRIUM VOLUME (MOD BIPLANE): 33 ML
LEFT ATRIUM VOLUME INDEX (MOD BIPLANE): 16.9 ML/M2
LEFT INTERNAL DIMENSION IN SYSTOLE: 3.3 CM (ref 2.1–4)
LEFT VENTRICULAR INTERNAL DIMENSION IN DIASTOLE: 4.8 CM (ref 3.5–6)
LEFT VENTRICULAR POSTERIOR WALL IN END DIASTOLE: 0.8 CM
LEFT VENTRICULAR STROKE VOLUME: 64 ML
LV EF US.2D.A4C+ESTIMATED: 53 %
LVSV (TEICH): 64 ML
MAX HR PERCENT: 92 %
MAX HR: 150 BPM
MV E'TISSUE VEL-SEP: 10 CM/S
MV PEAK A VEL: 0.58 M/S
MV PEAK E VEL: 58 CM/S
MV STENOSIS PRESSURE HALF TIME: 72 MS
MV VALVE AREA P 1/2 METHOD: 3.06
RATE PRESSURE PRODUCT: NORMAL
RIGHT ATRIUM AREA SYSTOLE A4C: 11.3 CM2
RIGHT VENTRICLE ID DIMENSION: 3.4 CM
SL CV LEFT ATRIUM LENGTH A2C: 4.2 CM
SL CV LV EF: 55
SL CV PED ECHO LEFT VENTRICLE DIASTOLIC VOLUME (MOD BIPLANE) 2D: 109 ML
SL CV PED ECHO LEFT VENTRICLE SYSTOLIC VOLUME (MOD BIPLANE) 2D: 45 ML
SL CV STRESS RECOVERY BP: NORMAL MMHG
SL CV STRESS RECOVERY HR: 102 BPM
SL CV STRESS RECOVERY O2 SAT: 97 %
SL CV STRESS STAGE REACHED: 4
STRESS ANGINA INDEX: 0
STRESS BASELINE BP: NORMAL MMHG
STRESS BASELINE HR: 78 BPM
STRESS O2 SAT REST: 96 %
STRESS PEAK HR: 150 BPM
STRESS POST ESTIMATED WORKLOAD: 0 METS
STRESS POST EXERCISE DUR MIN: 10 MIN
STRESS POST EXERCISE DUR SEC: 0 SEC
STRESS POST O2 SAT PEAK: 98 %
STRESS POST PEAK BP: 160 MMHG
TRICUSPID ANNULAR PLANE SYSTOLIC EXCURSION: 2.2 CM

## 2025-02-03 PROCEDURE — 93880 EXTRACRANIAL BILAT STUDY: CPT | Performed by: SURGERY

## 2025-02-03 PROCEDURE — 93017 CV STRESS TEST TRACING ONLY: CPT

## 2025-02-03 PROCEDURE — 93018 CV STRESS TEST I&R ONLY: CPT | Performed by: INTERNAL MEDICINE

## 2025-02-03 PROCEDURE — 93880 EXTRACRANIAL BILAT STUDY: CPT

## 2025-02-03 PROCEDURE — 93306 TTE W/DOPPLER COMPLETE: CPT

## 2025-02-03 PROCEDURE — 93306 TTE W/DOPPLER COMPLETE: CPT | Performed by: INTERNAL MEDICINE

## 2025-02-03 PROCEDURE — 93016 CV STRESS TEST SUPVJ ONLY: CPT | Performed by: INTERNAL MEDICINE

## 2025-02-04 LAB
CHEST PAIN STATEMENT: NORMAL
MAX DIASTOLIC BP: 84 MMHG
MAX PREDICTED HEART RATE: 163 BPM
PROTOCOL NAME: NORMAL
REASON FOR TERMINATION: NORMAL
STRESS POST EXERCISE DUR MIN: 10 MIN
STRESS POST EXERCISE DUR SEC: 0 SEC
STRESS POST PEAK HR: 150 BPM
STRESS POST PEAK SYSTOLIC BP: 170 MMHG
TARGET HR FORMULA: NORMAL
TEST INDICATION: NORMAL

## 2025-04-07 ENCOUNTER — APPOINTMENT (OUTPATIENT)
Dept: LAB | Facility: CLINIC | Age: 60
End: 2025-04-07
Payer: COMMERCIAL

## 2025-04-07 DIAGNOSIS — I65.23 CAROTID ATHEROSCLEROSIS, BILATERAL: ICD-10-CM

## 2025-04-07 LAB
CHOLEST SERPL-MCNC: 171 MG/DL (ref ?–200)
HDLC SERPL-MCNC: 49 MG/DL
LDLC SERPL CALC-MCNC: 109 MG/DL (ref 0–100)
TRIGL SERPL-MCNC: 64 MG/DL (ref ?–150)

## 2025-04-07 PROCEDURE — 36415 COLL VENOUS BLD VENIPUNCTURE: CPT

## 2025-04-07 PROCEDURE — 80061 LIPID PANEL: CPT

## 2025-04-09 ENCOUNTER — OFFICE VISIT (OUTPATIENT)
Dept: CARDIOLOGY CLINIC | Facility: CLINIC | Age: 60
End: 2025-04-09
Payer: COMMERCIAL

## 2025-04-09 VITALS
HEIGHT: 70 IN | DIASTOLIC BLOOD PRESSURE: 82 MMHG | SYSTOLIC BLOOD PRESSURE: 126 MMHG | WEIGHT: 175 LBS | BODY MASS INDEX: 25.05 KG/M2 | HEART RATE: 80 BPM

## 2025-04-09 DIAGNOSIS — R07.89 OTHER CHEST PAIN: Primary | ICD-10-CM

## 2025-04-09 DIAGNOSIS — Z82.49 FAMILY HISTORY OF EARLY CAD: ICD-10-CM

## 2025-04-09 DIAGNOSIS — I65.23 CAROTID ATHEROSCLEROSIS, BILATERAL: ICD-10-CM

## 2025-04-09 DIAGNOSIS — I34.0 NONRHEUMATIC MITRAL VALVE REGURGITATION: ICD-10-CM

## 2025-04-09 PROCEDURE — 99214 OFFICE O/P EST MOD 30 MIN: CPT | Performed by: INTERNAL MEDICINE

## 2025-04-09 NOTE — PROGRESS NOTES
[facility] [place] [address] [facility] [address] [phone]                                              Cardiology Office Follow up  Coy [place], 59 y.o. male  YOB: 1965  MRN: [id number] Encounter: [id number]      PCP - Fercho Adams MD  Referring Provider - No ref. provider found    Chief Complaint   Patient presents with   • s/p testing       Assessment  Chest discomfort  Carotid atherosclerosis  Family history of early CAD    Prior cardiac / relevant testing (reviewed on patient's phone today)  Stress test - 5/23/16 - 13:02 min, 99%MPHR , negatie stres ecg, no significant arrhythmia  TTE - 6/2016 ([facility]) - LVEF 60-70%, mild MR/TR, mild thickening of MV  Carotid duplex 3/24/2016 ([facility]) - <50% B/L stenosis, minimal b/l intimal plaque    Plan  Assessment & Plan  Other chest pain  Overview  1/2025: initial OV with me  Atypical symptoms, no clear triggers or worsening features  Mostly appears to be brief lasting about 5 minutes and occurring even at rest - ?  Mostly when getting on or off planes  ECG  -normal sinus rhythm, no acute ST-T changes  2/3/25 Exercise ECG stress test  10:00 min, 92% MPHR  No chest pain with exercise  Stress ECG negative for ischemia  4/2025: Office follow up  No further complaints of chest pain, he has since traveled on a plane and did not have recurrence of symptoms  Impression  Non-cardiac chest pain - ?stress/anxiety - now improved  Plan  Continue to monitor for exertional symptoms  Let me know if any new or recurrent issues  Optimize cardiac risk factors   Discussed CT coronary calcium score to risk stratify from a cardiac standpoint regarding underlying coronary artery disease and he is interested --> will complete CT CAC score and follow up based on same    Family history of early CAD  Overview  Primary concern: Father had coronary artery stents in 60s  Plan  Discussed cardiac restratification with CT  coronary artery calcium score, and he is interested in the same  Will check CT coronary calcium score  If elevated, then we will follow-up for 10 earlier timeframe to discuss further  Carotid atherosclerosis, bilateral      24 ([facility])    Cholesterol 198   Triglycerides 78   HDL 63      VLDL 14   3/24/2016 Carotid duplex ([facility]) - <50% B/L stenosis, minimal b/l intimal plaque  2/3/25 VAS carotid duplex -no evidence of disease throughout the extracranial carotid arteries, no significant subclavian artery disease  Cholesterol levels are borderline,  on updated study  Optimize vascular risk factors including lipids  Regular cardio exercises  Nonrheumatic mitral valve regurgitation  Overview  2016: TTE ([facility]) - LVEF 60-70%, mild MR/TR, mild thickening of MV  2025: TTE - LVEF 55%, trace MR, no evidence of MVP  Impression  Normal valvular function  Plan  Monitor clinically          No results found for this visit on 25.      Orders Placed This Encounter   Procedures   • CT coronary calcium score       Return in about 3 months (around 2025), or if symptoms worsen or fail to improve.      History of Present Illness   59 y.o. male , who runs a [employer], comes in as a new patient for consultation regarding ongoing symptoms of chest discomfort.    Chest pain - ache/tightness, central, mostly seems when to occur while getting on & off planes, about once in 2-4 weeks, resolves within a few minutes.  Recently it occurred such that the symptoms  To the back and radiated into the left arm as well which got more concerned.    Mountain bike - 20 miles in 2 hours without problems.  Walks a lot    Alcohol occasional - rarely may have 3-5 drinks.    Family history  Father -  at 75, heart disease s/p coronary stents (in 60s), carotid issues, alcohol abuse/heavy meat use  Mother - alive at 78, no known CAD or heart problems  Siblings: Oldest of 3 siblings  No known heart problems    Interval  "history - 4/9/2025  He returns for follow-up with me after about 3 months.  In the interim he has completed the echocardiogram, stress test and vascular carotid duplex, none of which have revealed any major abnormalities.  He reports improvement in chest discomfort symptoms, and did not have recurrence during the last flight.        Historical Information   Past Medical History:   Diagnosis Date   • Carotid artery occlusion    • Colon polyp      Past Surgical History:   Procedure Laterality Date   • FACIAL FRACTURE SURGERY     • LUMBAR DISCECTOMY       Family History   Problem Relation Age of Onset   • Heart disease Father      Current Outpatient Medications   Medication Instructions   • Cetirizine HCl (ZYRTEC PO) Take by mouth   • Dexmethylphenidate HCl (FOCALIN PO) 10 mg, As needed      No Known Allergies  Social History     Socioeconomic History   • Marital status: Single     Spouse name: None   • Number of children: None   • Years of education: None   • Highest education level: None   Occupational History   • None   Tobacco Use   • Smoking status: Former   • Smokeless tobacco: Never   Vaping Use   • Vaping status: Never Used   Substance and Sexual Activity   • Alcohol use: Yes   • Drug use: Not Currently   • Sexual activity: None   Other Topics Concern   • None   Social History Narrative   • None     Social Drivers of Health     Financial Resource Strain: Not on file   Food Insecurity: Not on file   Transportation Needs: Not on file   Physical Activity: Not on file   Stress: Not on file   Social Connections: Not on file   Intimate Partner Violence: Not on file   Housing Stability: Not on file        Review of Systems   All other systems reviewed and are negative.        Vitals:  Vitals:    04/09/25 1428   BP: 126/82   Pulse: 80   Weight: 79.4 kg (175 lb)   Height: 5' 10\" (1.778 m)     BMI - Body mass index is 25.11 kg/m².  Wt Readings from Last 7 Encounters:   04/09/25 79.4 kg (175 lb)   02/03/25 77.6 kg (171 " "lb)   02/03/25 77.6 kg (171 lb)   01/15/25 77.6 kg (171 lb)   12/07/23 77.1 kg (170 lb)   09/21/23 73 kg (161 lb)   10/07/20 76.2 kg (168 lb)       Physical Exam  Vitals and nursing note reviewed.   Constitutional:       General: He is not in acute distress.     Appearance: He is well-developed. He is not ill-appearing or diaphoretic.   HENT:      Head: Normocephalic and atraumatic.      Nose: No congestion.   Eyes:      General: No scleral icterus.     Conjunctiva/sclera: Conjunctivae normal.   Neck:      Vascular: No carotid bruit or JVD.   Cardiovascular:      Rate and Rhythm: Normal rate and regular rhythm.      Heart sounds: Normal heart sounds. No murmur heard.     No friction rub. No gallop.   Pulmonary:      Effort: Pulmonary effort is normal. No respiratory distress.      Breath sounds: Normal breath sounds. No wheezing or rales.   Chest:      Chest wall: No tenderness.   Abdominal:      General: There is no distension.      Palpations: Abdomen is soft.      Tenderness: There is no abdominal tenderness.   Musculoskeletal:         General: No swelling, tenderness or deformity.      Cervical back: Neck supple. No muscular tenderness.      Right lower leg: No edema.      Left lower leg: No edema.   Skin:     General: Skin is warm.   Neurological:      General: No focal deficit present.      Mental Status: He is alert and oriented to person, place, and time. Mental status is at baseline.   Psychiatric:         Mood and Affect: Mood normal.         Behavior: Behavior normal.         Thought Content: Thought content normal.           Labs:  CBC: No results found for: \"WBC\", \"RBC\", \"HGB\", \"HCT\", \"MCV\", \"PLT\", \"RDW\"    CMP: No results found for: \"NA\", \"K\", \"CL\", \"CO2\", \"ANIONGAP\", \"BUN\", \"CREATININE\", \"EGFR\", \"GLUCOSE\", \"CALCIUM\", \"AST\", \"ALT\", \"ALKPHOS\", \"PROT\", \"BILITOT\"    Magnesium:  No results found for: \"MG\"    Lipid Profile:   Lab Results   Component Value Date    HDL 49 04/07/2025    TRIG 64 04/07/2025    " "LDLCALC 109 (H) 04/07/2025       Thyroid Studies: No results found for: \"SEQ1IFOMYIPB\", \"T3FREE\", \"FREET4\", \"D4AZNYI\", \"Q0LZYUE\"    A1c:  No components found for: \"HGA1C\"    INR:  No results found for: \"INR\"5    Cardiac testing:   No results found for this or any previous visit.    No results found for this or any previous visit.    No results found for this or any previous visit.    No results found for this or any previous visit.      Stress strip  Confirmed by MAURA COPELAND ([id number]),  Susie England ([id number]) on 2/4/2025 1:13:53 PM         "

## 2025-04-29 ENCOUNTER — HOSPITAL ENCOUNTER (OUTPATIENT)
Dept: CT IMAGING | Facility: HOSPITAL | Age: 60
Discharge: HOME | End: 2025-04-29
Attending: INTERNAL MEDICINE
Payer: COMMERCIAL

## 2025-04-29 DIAGNOSIS — R07.89 OTHER CHEST PAIN: ICD-10-CM

## 2025-04-29 DIAGNOSIS — Z82.49 FAMILY HISTORY OF EARLY CAD: ICD-10-CM

## 2025-04-29 PROCEDURE — 75571 CT HRT W/O DYE W/CA TEST: CPT

## 2025-05-02 ENCOUNTER — RESULTS FOLLOW-UP (OUTPATIENT)
Dept: CARDIOLOGY CLINIC | Facility: CLINIC | Age: 60
End: 2025-05-02